# Patient Record
Sex: MALE | Race: WHITE | NOT HISPANIC OR LATINO | Employment: OTHER | URBAN - METROPOLITAN AREA
[De-identification: names, ages, dates, MRNs, and addresses within clinical notes are randomized per-mention and may not be internally consistent; named-entity substitution may affect disease eponyms.]

---

## 2023-03-08 PROBLEM — R76.8 ELEVATED ANTINUCLEAR ANTIBODY (ANA) LEVEL: Status: ACTIVE | Noted: 2023-03-08

## 2023-03-08 PROBLEM — Z86.718 HISTORY OF DVT (DEEP VEIN THROMBOSIS): Status: ACTIVE | Noted: 2023-03-08

## 2023-03-08 PROBLEM — R74.8 ELEVATED ALKALINE PHOSPHATASE LEVEL: Status: ACTIVE | Noted: 2023-03-08

## 2023-03-08 PROBLEM — M35.3 POLYMYALGIA RHEUMATICA (MULTI): Status: ACTIVE | Noted: 2023-03-08

## 2023-03-08 PROBLEM — M81.0 OSTEOPOROSIS: Status: ACTIVE | Noted: 2023-03-08

## 2023-03-08 PROBLEM — F20.2: Status: ACTIVE | Noted: 2023-03-08

## 2023-03-08 PROBLEM — M65.30 TRIGGER FINGER: Status: ACTIVE | Noted: 2023-03-08

## 2023-03-08 PROBLEM — R79.89 HIGH SERUM CHOLESTANOL: Status: ACTIVE | Noted: 2023-03-08

## 2023-03-08 RX ORDER — CYCLOBENZAPRINE HCL 10 MG
10 TABLET ORAL 3 TIMES DAILY PRN
COMMUNITY
End: 2023-09-06 | Stop reason: ALTCHOICE

## 2023-03-08 RX ORDER — ESCITALOPRAM OXALATE 10 MG/1
1.5 TABLET ORAL DAILY
COMMUNITY
End: 2023-03-13

## 2023-03-08 RX ORDER — QUETIAPINE FUMARATE 100 MG/1
TABLET, FILM COATED ORAL
COMMUNITY
End: 2023-03-13

## 2023-03-08 RX ORDER — LAMOTRIGINE 25 MG/1
TABLET ORAL
COMMUNITY
End: 2023-03-13

## 2023-03-08 RX ORDER — HYDROXYZINE HYDROCHLORIDE 25 MG/1
25 TABLET, FILM COATED ORAL 2 TIMES DAILY PRN
COMMUNITY
End: 2023-03-13

## 2023-03-08 RX ORDER — KETOCONAZOLE 20 MG/G
CREAM TOPICAL
COMMUNITY
End: 2023-03-13

## 2023-03-08 RX ORDER — PREDNISONE 5 MG/1
15 TABLET ORAL DAILY
COMMUNITY
Start: 2022-12-21 | End: 2023-03-13

## 2023-03-08 RX ORDER — BUSPIRONE HYDROCHLORIDE 30 MG/1
TABLET ORAL
COMMUNITY
End: 2023-03-13

## 2023-03-08 RX ORDER — MIRTAZAPINE 30 MG/1
30 TABLET, FILM COATED ORAL NIGHTLY
COMMUNITY
End: 2023-03-13

## 2023-03-08 RX ORDER — LITHIUM CARBONATE 300 MG/1
CAPSULE ORAL
COMMUNITY
End: 2023-03-13

## 2023-03-08 RX ORDER — HYDROXYZINE PAMOATE 25 MG/1
25 CAPSULE ORAL 2 TIMES DAILY PRN
COMMUNITY
End: 2023-03-13

## 2023-03-08 RX ORDER — LORAZEPAM 0.5 MG/1
TABLET ORAL
COMMUNITY
End: 2023-03-13

## 2023-03-08 RX ORDER — QUETIAPINE FUMARATE 200 MG/1
TABLET, FILM COATED ORAL
COMMUNITY
End: 2023-03-13

## 2023-03-08 RX ORDER — SERTRALINE HYDROCHLORIDE 50 MG/1
50 TABLET, FILM COATED ORAL DAILY
COMMUNITY
End: 2023-03-13

## 2023-03-08 RX ORDER — SERTRALINE HYDROCHLORIDE 100 MG/1
100 TABLET, FILM COATED ORAL DAILY
COMMUNITY
End: 2023-05-16 | Stop reason: SDUPTHER

## 2023-03-08 RX ORDER — QUETIAPINE FUMARATE 50 MG/1
50 TABLET, FILM COATED ORAL NIGHTLY PRN
COMMUNITY
End: 2023-03-13

## 2023-03-08 RX ORDER — PROPRANOLOL HYDROCHLORIDE 20 MG/1
20 TABLET ORAL 2 TIMES DAILY
COMMUNITY
End: 2023-03-13

## 2023-03-13 ENCOUNTER — OFFICE VISIT (OUTPATIENT)
Dept: PRIMARY CARE | Facility: CLINIC | Age: 73
End: 2023-03-13
Payer: MEDICARE

## 2023-03-13 ENCOUNTER — LAB (OUTPATIENT)
Dept: LAB | Facility: LAB | Age: 73
End: 2023-03-13
Payer: MEDICARE

## 2023-03-13 VITALS
WEIGHT: 198 LBS | HEART RATE: 91 BPM | DIASTOLIC BLOOD PRESSURE: 77 MMHG | HEIGHT: 67 IN | SYSTOLIC BLOOD PRESSURE: 122 MMHG | BODY MASS INDEX: 31.08 KG/M2

## 2023-03-13 DIAGNOSIS — Z13.6 SCREENING FOR AAA (ABDOMINAL AORTIC ANEURYSM): ICD-10-CM

## 2023-03-13 DIAGNOSIS — E78.5 DYSLIPIDEMIA: ICD-10-CM

## 2023-03-13 DIAGNOSIS — M35.3 POLYMYALGIA RHEUMATICA (MULTI): ICD-10-CM

## 2023-03-13 DIAGNOSIS — R53.81 GENERAL DETERIORATION OF HEALTH: ICD-10-CM

## 2023-03-13 DIAGNOSIS — Z12.11 SCREEN FOR COLON CANCER: ICD-10-CM

## 2023-03-13 DIAGNOSIS — R97.20 PSA ELEVATION: ICD-10-CM

## 2023-03-13 DIAGNOSIS — F20.2: ICD-10-CM

## 2023-03-13 DIAGNOSIS — Z12.5 SCREENING PSA (PROSTATE SPECIFIC ANTIGEN): ICD-10-CM

## 2023-03-13 DIAGNOSIS — M80.00XD AGE-RELATED OSTEOPOROSIS WITH CURRENT PATHOLOGICAL FRACTURE WITH ROUTINE HEALING, SUBSEQUENT ENCOUNTER: Primary | ICD-10-CM

## 2023-03-13 DIAGNOSIS — M80.00XD AGE-RELATED OSTEOPOROSIS WITH CURRENT PATHOLOGICAL FRACTURE WITH ROUTINE HEALING, SUBSEQUENT ENCOUNTER: ICD-10-CM

## 2023-03-13 PROBLEM — R76.8 ELEVATED ANTINUCLEAR ANTIBODY (ANA) LEVEL: Status: RESOLVED | Noted: 2023-03-08 | Resolved: 2023-03-13

## 2023-03-13 PROBLEM — M65.30 TRIGGER FINGER: Status: RESOLVED | Noted: 2023-03-08 | Resolved: 2023-03-13

## 2023-03-13 PROBLEM — K62.89 PROCTITIS: Status: ACTIVE | Noted: 2023-03-13

## 2023-03-13 LAB
ANTICARDIOLIPIN IGA ANTIBODY: 0.8 APL U/ML (ref 0–20)
ANTICARDIOLIPIN IGG ANTIBODY: <1.6 GPL U/ML (ref 0–20)
ANTICARDIOLIPIN IGM ANTIBODY: 5.1 MPL U/ML (ref 0–20)
BETA 2 GLYCOPROTEIN 1 IGA AB IN SERUM: 1.4 U/ML (ref 0–20)
BETA 2 GLYCOPROTEIN 1 IGG AB IN SERUM: 2.2 U/ML (ref 0–20)
BETA 2 GLYCOPROTEIN 1 IGM ANTIBODY IN SERUM: 9.7 U/ML (ref 0–20)
CALCIDIOL (25 OH VITAMIN D3) (NG/ML) IN SER/PLAS: 34 NG/ML
ERYTHROCYTE DISTRIBUTION WIDTH (RATIO) BY AUTOMATED COUNT: 13.8 % (ref 11.5–14.5)
ERYTHROCYTE MEAN CORPUSCULAR HEMOGLOBIN CONCENTRATION (G/DL) BY AUTOMATED: 32.2 G/DL (ref 32–36)
ERYTHROCYTE MEAN CORPUSCULAR VOLUME (FL) BY AUTOMATED COUNT: 85 FL (ref 80–100)
ERYTHROCYTES (10*6/UL) IN BLOOD BY AUTOMATED COUNT: 4.77 X10E12/L (ref 4.5–5.9)
HEMATOCRIT (%) IN BLOOD BY AUTOMATED COUNT: 40.7 % (ref 41–52)
HEMOGLOBIN (G/DL) IN BLOOD: 13.1 G/DL (ref 13.5–17.5)
LEUKOCYTES (10*3/UL) IN BLOOD BY AUTOMATED COUNT: 7.6 X10E9/L (ref 4.4–11.3)
NRBC (PER 100 WBCS) BY AUTOMATED COUNT: 0 /100 WBC (ref 0–0)
PLATELETS (10*3/UL) IN BLOOD AUTOMATED COUNT: 321 X10E9/L (ref 150–450)

## 2023-03-13 PROCEDURE — 84153 ASSAY OF PSA TOTAL: CPT

## 2023-03-13 PROCEDURE — 36415 COLL VENOUS BLD VENIPUNCTURE: CPT

## 2023-03-13 PROCEDURE — 84443 ASSAY THYROID STIM HORMONE: CPT

## 2023-03-13 PROCEDURE — 80053 COMPREHEN METABOLIC PANEL: CPT

## 2023-03-13 PROCEDURE — 1036F TOBACCO NON-USER: CPT | Performed by: INTERNAL MEDICINE

## 2023-03-13 PROCEDURE — 82550 ASSAY OF CK (CPK): CPT

## 2023-03-13 PROCEDURE — 86803 HEPATITIS C AB TEST: CPT

## 2023-03-13 PROCEDURE — 99214 OFFICE O/P EST MOD 30 MIN: CPT | Performed by: INTERNAL MEDICINE

## 2023-03-13 PROCEDURE — 87389 HIV-1 AG W/HIV-1&-2 AB AG IA: CPT

## 2023-03-13 PROCEDURE — 85027 COMPLETE CBC AUTOMATED: CPT

## 2023-03-13 PROCEDURE — 80061 LIPID PANEL: CPT

## 2023-03-13 RX ORDER — QUETIAPINE FUMARATE 200 MG/1
200 TABLET, FILM COATED ORAL NIGHTLY
COMMUNITY
End: 2023-05-16 | Stop reason: SDUPTHER

## 2023-03-13 ASSESSMENT — PATIENT HEALTH QUESTIONNAIRE - PHQ9
2. FEELING DOWN, DEPRESSED OR HOPELESS: NOT AT ALL
1. LITTLE INTEREST OR PLEASURE IN DOING THINGS: NOT AT ALL
SUM OF ALL RESPONSES TO PHQ9 QUESTIONS 1 AND 2: 0

## 2023-03-13 NOTE — PROGRESS NOTES
"Subjective   Patient ID: Ryan Rasheed is a 72 y.o. male who presents for New Patient Visit ([facility]).    HPI   Patient is 72-year-old male with past medical history of DVT possible rheumatoid arthritis versus polymyalgia rheumatica, osteoporosis and history of proctitis, bipolar disorder who presents to [facility].    Patient states he recently moved to [place] from [place].  His family lives in [place].    He recently went to the emergency room due to stiffness.  He was seen by rheumatology and thought to be due to PMR.  He states that the short course of steroids resolved his symptoms in its entirety.  He is currently not on any steroids and has follow-up with rheumatology upcoming.  There is a thought that he may have rheumatism however it has not been fully worked up.  There is concerned he may have osteoporosis and history of osteoporotic fractures and he has a DEXA scan currently pending.    He has bipolar disorder and is currently on Seroquel and Zoloft.  He states that the lithium was quite effective historically however he states that his psychiatrist diagnosed him with bipolar.    He reports a history of proctitis which she states was due to excessive ibuprofen intake.  He has stopped ibuprofen in its entirety.    He is overweight which he attributes to his psychiatric medications.  He does not exercise regularly.    He is currently retired and lives alone.    Review of Systems  Constitutional: No fever or chills  Cardiovascular: no chest pain, no palpitations and no syncope.   Respiratory: no cough, no shortness of breath during exertion and no shortness of breath at rest.   Gastrointestinal: no abdominal pain, no nausea and no vomiting.  Neuro: No Headache, no dizziness    Objective   /77   Pulse 91   Ht 1.702 m (5' 7\")   Wt 89.8 kg (198 lb)   BMI 31.01 kg/m²     Physical Exam  Constitutional: Alert and in no acute distress. Well developed, well nourished  Head and Face: Head " and face: Normal.    Cardiovascular: Heart rate and rhythm were normal, normal S1 and S2. No peripheral edema.   Pulmonary: No respiratory distress. Clear bilateral breath sounds.  Musculoskeletal: Gait and station: Normal. Muscle strength/tone: Normal.   Skin: Normal skin color and pigmentation, normal skin turgor, and no rash.    Psychiatric: Judgment and insight: Intact. Mood and affect: Normal.     [unfilled]      Assessment/Plan   Problem List Items Addressed This Visit          Nervous    Polymyalgia rheumatica (CMS/HCC)     Concern for PMR  Following with Rheum   No longer on Steroids ; reeval upcoming          Relevant Orders    Comprehensive metabolic panel    CBC    Lipid Panel    TSH with reflex to Free T4 if abnormal    CK    HIV 1/2 antibodies, rapid    Hepatitis C Antibody       Musculoskeletal    Osteoporosis - Primary     Dexa scan pending  Advised Vit d and Ca otc          Relevant Orders    Comprehensive metabolic panel    CBC    Lipid Panel    TSH with reflex to Free T4 if abnormal    CK    HIV 1/2 antibodies, rapid    Hepatitis C Antibody       Other    Malignant catatonia (CMS/HCC)    Relevant Orders    Comprehensive metabolic panel    CBC    Lipid Panel    TSH with reflex to Free T4 if abnormal    CK    HIV 1/2 antibodies, rapid    Hepatitis C Antibody     Other Visit Diagnoses       Screen for colon cancer        Relevant Orders    Colonoscopy    Comprehensive metabolic panel    CBC    Lipid Panel    TSH with reflex to Free T4 if abnormal    CK    HIV 1/2 antibodies, rapid    Screening PSA (prostate specific antigen)        Relevant Orders    PSA    HIV 1/2 antibodies, rapid    Screening for AAA (abdominal aortic aneurysm)        Relevant Orders    Vascular US abdominal aorta anuerysm AAA screening    Dyslipidemia        Relevant Orders    Lipid Panel    PSA elevation        Relevant Orders    PSA    General deterioration of health        Relevant Orders    TSH with reflex to Free T4 if  abnormal    HIV 1/2 antibodies, rapid          Polymyalgia rheumatica versus RA versus other  Symptoms improved with steroids.  Now off steroids altogether.  Advise follow-up with rheumatology.  Check CPK    Bipolar disorder?  Continue following with psychiatry  Symptoms controlled on sertraline and Seroquel    History of DVT  Unclear cause however completed course of anticoagulation.  No longer on anticoagulation    History of proctitis  Patient reports it was related to NSAID use.  Avoid NSAIDs  We will order screening colonoscopy    Possible osteoporosis  Patient to have DEXA scan    Dyslipidemia  Check lipid panel    Health maintenance  Screening labs ordered  Colonoscopy ordered  Follow-up for physical

## 2023-03-14 LAB
ALANINE AMINOTRANSFERASE (SGPT) (U/L) IN SER/PLAS: 32 U/L (ref 10–52)
ALBUMIN (G/DL) IN SER/PLAS: 4.2 G/DL (ref 3.4–5)
ALKALINE PHOSPHATASE (U/L) IN SER/PLAS: 116 U/L (ref 33–136)
ANION GAP IN SER/PLAS: 14 MMOL/L (ref 10–20)
ANTI-NUCLEAR ANTIBODY (ANA): NEGATIVE
ASPARTATE AMINOTRANSFERASE (SGOT) (U/L) IN SER/PLAS: 31 U/L (ref 9–39)
BILIRUBIN TOTAL (MG/DL) IN SER/PLAS: 0.5 MG/DL (ref 0–1.2)
CALCIUM (MG/DL) IN SER/PLAS: 9.3 MG/DL (ref 8.6–10.6)
CARBON DIOXIDE, TOTAL (MMOL/L) IN SER/PLAS: 27 MMOL/L (ref 21–32)
CHLORIDE (MMOL/L) IN SER/PLAS: 102 MMOL/L (ref 98–107)
CHOLESTEROL (MG/DL) IN SER/PLAS: 199 MG/DL (ref 0–199)
CHOLESTEROL IN HDL (MG/DL) IN SER/PLAS: 45.7 MG/DL
CHOLESTEROL/HDL RATIO: 4.4
CREATINE KINASE (U/L) IN SER/PLAS: 134 U/L (ref 0–325)
CREATININE (MG/DL) IN SER/PLAS: 0.97 MG/DL (ref 0.5–1.3)
GFR MALE: 84 ML/MIN/1.73M2
GLUCOSE (MG/DL) IN SER/PLAS: 84 MG/DL (ref 74–99)
HEPATITIS C VIRUS AB PRESENCE IN SERUM: NONREACTIVE
HIV 1/ 2 AG/AB SCREEN: NONREACTIVE
LDL: 135 MG/DL (ref 0–99)
POTASSIUM (MMOL/L) IN SER/PLAS: 4.3 MMOL/L (ref 3.5–5.3)
PROSTATE SPECIFIC AG (NG/ML) IN SER/PLAS: 1.7 NG/ML (ref 0–4)
PROTEIN TOTAL: 7.5 G/DL (ref 6.4–8.2)
SODIUM (MMOL/L) IN SER/PLAS: 139 MMOL/L (ref 136–145)
THYROTROPIN (MIU/L) IN SER/PLAS BY DETECTION LIMIT <= 0.05 MIU/L: 3.95 MIU/L (ref 0.44–3.98)
TRIGLYCERIDE (MG/DL) IN SER/PLAS: 92 MG/DL (ref 0–149)
UREA NITROGEN (MG/DL) IN SER/PLAS: 21 MG/DL (ref 6–23)
VLDL: 18 MG/DL (ref 0–40)

## 2023-03-16 LAB
BONE SPECIFIC ALKALINE PHOSPHATASE: 17.4 UG/L (ref 6.5–20.1)
CITRULLINE ANTIBODY, IGG: 3 UNITS (ref 0–19)
DILUTE RUSSELL VIPER VENOM TIME CONF: 1.28 RATIO
DILUTE RUSSELL VIPER VENOM TIME SCREEN: 1.39 RATIO
DILUTE RUSSELL VIPER VENOM TIME TEST RATIO: 1.08 RATIO
HLAB27 TYPING: NEGATIVE
LUPUS ANTICOAGULANT INTERPRETATION: NORMAL
NORMALIZED SILICA CLOTTING TIME (RATIO) OF PPP: 0.89 RATIO
SILICA CLOTTING TIME CONFIRMATION: 1.29 RATIO
SILICA CLOTTING TIME SCREEN: 1.14 RATIO

## 2023-03-17 LAB
ALKALINE PHOSPHATASE (REF): 139 U/L (ref 40–120)
ALKALINE PHOSPHATASE OTHER: 0 U/L
ALKALINE PHOSPHATASE.BONE (U/L) IN SERUM OR PLASMA: 38 U/L (ref 0–55)
ALKALINE PHOSPHATASE.LIVER (U/L) IN SERUM OR PLASMA: 101 U/L (ref 0–94)

## 2023-03-28 ENCOUNTER — OFFICE VISIT (OUTPATIENT)
Dept: PRIMARY CARE | Facility: CLINIC | Age: 73
End: 2023-03-28
Payer: MEDICARE

## 2023-03-28 VITALS
OXYGEN SATURATION: 95 % | BODY MASS INDEX: 30.45 KG/M2 | RESPIRATION RATE: 19 BRPM | SYSTOLIC BLOOD PRESSURE: 120 MMHG | HEART RATE: 79 BPM | WEIGHT: 194 LBS | HEIGHT: 67 IN | DIASTOLIC BLOOD PRESSURE: 80 MMHG

## 2023-03-28 DIAGNOSIS — R41.3 MEMORY CHANGES: ICD-10-CM

## 2023-03-28 DIAGNOSIS — Z87.891 FORMER SMOKER: Primary | ICD-10-CM

## 2023-03-28 DIAGNOSIS — Z12.11 COLON CANCER SCREENING: ICD-10-CM

## 2023-03-28 PROCEDURE — 1036F TOBACCO NON-USER: CPT | Performed by: STUDENT IN AN ORGANIZED HEALTH CARE EDUCATION/TRAINING PROGRAM

## 2023-03-28 PROCEDURE — 99215 OFFICE O/P EST HI 40 MIN: CPT | Performed by: STUDENT IN AN ORGANIZED HEALTH CARE EDUCATION/TRAINING PROGRAM
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
## 2023-03-28 NOTE — PATIENT INSTRUCTIONS
Thank you for coming in to see us today!    Please schedule a colonoscopy at a time that works for you, as well as get your ULTRASOUND done of your abdomen to assess for an aneurysm given your remote smoking history.     I have also referred you for NEUROPSYCHOLOGY evaluation given your memory concerns. This may take a few months to get an appointment but will be very helpful in our assessment.     I would like to see you back for a complete physical in 2-3 months, sooner if any acute issues arise.     Thank you,  Dr. Gomez

## 2023-05-16 ENCOUNTER — APPOINTMENT (OUTPATIENT)
Dept: PRIMARY CARE | Facility: CLINIC | Age: 73
End: 2023-05-16
Payer: MEDICARE

## 2023-05-16 ENCOUNTER — OFFICE VISIT (OUTPATIENT)
Dept: PRIMARY CARE | Facility: CLINIC | Age: 73
End: 2023-05-16
Payer: MEDICARE

## 2023-05-16 VITALS
BODY MASS INDEX: 30.45 KG/M2 | SYSTOLIC BLOOD PRESSURE: 145 MMHG | HEART RATE: 93 BPM | WEIGHT: 194 LBS | OXYGEN SATURATION: 95 % | DIASTOLIC BLOOD PRESSURE: 86 MMHG | RESPIRATION RATE: 17 BRPM | HEIGHT: 67 IN

## 2023-05-16 DIAGNOSIS — F41.0 PANIC DISORDER: ICD-10-CM

## 2023-05-16 DIAGNOSIS — F31.70 BIPOLAR AFFECTIVE DISORDER IN REMISSION (MULTI): Primary | ICD-10-CM

## 2023-05-16 DIAGNOSIS — F51.01 PRIMARY INSOMNIA: ICD-10-CM

## 2023-05-16 PROBLEM — F13.21: Status: ACTIVE | Noted: 2021-03-28

## 2023-05-16 PROBLEM — F43.9 SITUATIONAL STRESS: Status: ACTIVE | Noted: 2023-01-16

## 2023-05-16 PROBLEM — F31.4: Status: ACTIVE | Noted: 2020-12-07

## 2023-05-16 PROBLEM — F33.9 RECURRENT MAJOR DEPRESSIVE DISORDER (CMS-HCC): Status: ACTIVE | Noted: 2023-01-16

## 2023-05-16 PROBLEM — F90.0 ATTENTION-DEFICIT HYPERACTIVITY DISORDER, PREDOMINANTLY INATTENTIVE TYPE: Status: ACTIVE | Noted: 2023-01-16

## 2023-05-16 PROBLEM — F10.21 ALCOHOL DEPENDENCE IN EARLY FULL REMISSION (MULTI): Status: ACTIVE | Noted: 2020-12-07

## 2023-05-16 PROBLEM — M54.16 LUMBAR RADICULITIS: Status: ACTIVE | Noted: 2023-05-16

## 2023-05-16 PROBLEM — F41.1 GENERALIZED ANXIETY DISORDER: Status: ACTIVE | Noted: 2020-12-07

## 2023-05-16 PROCEDURE — 1036F TOBACCO NON-USER: CPT | Performed by: STUDENT IN AN ORGANIZED HEALTH CARE EDUCATION/TRAINING PROGRAM

## 2023-05-16 PROCEDURE — 99213 OFFICE O/P EST LOW 20 MIN: CPT | Performed by: STUDENT IN AN ORGANIZED HEALTH CARE EDUCATION/TRAINING PROGRAM

## 2023-05-16 RX ORDER — QUETIAPINE FUMARATE 200 MG/1
200 TABLET, FILM COATED ORAL NIGHTLY
Qty: 30 TABLET | Refills: 30 | Status: SHIPPED | OUTPATIENT
Start: 2023-05-16 | End: 2023-09-06 | Stop reason: ALTCHOICE

## 2023-05-16 RX ORDER — ALENDRONATE SODIUM 70 MG/1
70 TABLET ORAL
COMMUNITY
Start: 2023-03-14 | End: 2023-12-07 | Stop reason: SDUPTHER

## 2023-05-16 RX ORDER — CHOLECALCIFEROL (VITAMIN D3) 125 MCG
1 CAPSULE ORAL DAILY
COMMUNITY
Start: 2023-03-14

## 2023-05-16 RX ORDER — SERTRALINE HYDROCHLORIDE 100 MG/1
100 TABLET, FILM COATED ORAL DAILY
Qty: 90 TABLET | Refills: 90 | Status: SHIPPED | OUTPATIENT
Start: 2023-05-16 | End: 2023-09-06 | Stop reason: ALTCHOICE

## 2023-05-16 RX ORDER — MULTIVITAMIN
1 TABLET ORAL DAILY
COMMUNITY
Start: 2023-03-14
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
## 2023-05-17 ENCOUNTER — APPOINTMENT (OUTPATIENT)
Dept: PRIMARY CARE | Facility: CLINIC | Age: 73
End: 2023-05-17
Payer: MEDICARE

## 2023-06-02 ENCOUNTER — APPOINTMENT (OUTPATIENT)
Dept: PRIMARY CARE | Facility: CLINIC | Age: 73
End: 2023-06-02
Payer: MEDICARE

## 2023-06-06 ENCOUNTER — OFFICE VISIT (OUTPATIENT)
Dept: PRIMARY CARE | Facility: CLINIC | Age: 73
End: 2023-06-06
Payer: MEDICARE

## 2023-06-06 VITALS
DIASTOLIC BLOOD PRESSURE: 83 MMHG | HEIGHT: 67 IN | OXYGEN SATURATION: 95 % | WEIGHT: 194 LBS | SYSTOLIC BLOOD PRESSURE: 131 MMHG | RESPIRATION RATE: 17 BRPM | BODY MASS INDEX: 30.45 KG/M2 | HEART RATE: 75 BPM

## 2023-06-06 DIAGNOSIS — Z12.11 COLON CANCER SCREENING: ICD-10-CM

## 2023-06-06 DIAGNOSIS — E78.5 DYSLIPIDEMIA: Primary | ICD-10-CM

## 2023-06-06 PROBLEM — M51.36 BULGING LUMBAR DISC: Status: ACTIVE | Noted: 2023-05-16

## 2023-06-06 PROBLEM — M51.369 BULGING LUMBAR DISC: Status: ACTIVE | Noted: 2023-05-16

## 2023-06-06 PROCEDURE — 99214 OFFICE O/P EST MOD 30 MIN: CPT | Performed by: STUDENT IN AN ORGANIZED HEALTH CARE EDUCATION/TRAINING PROGRAM

## 2023-06-06 PROCEDURE — 1036F TOBACCO NON-USER: CPT | Performed by: STUDENT IN AN ORGANIZED HEALTH CARE EDUCATION/TRAINING PROGRAM

## 2023-06-06 RX ORDER — PREDNISONE 1 MG/1
TABLET ORAL
COMMUNITY
Start: 2023-05-23 | End: 2023-09-06 | Stop reason: ALTCHOICE
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
## 2023-09-06 ENCOUNTER — OFFICE VISIT (OUTPATIENT)
Dept: PRIMARY CARE | Facility: CLINIC | Age: 73
End: 2023-09-06
Payer: MEDICARE

## 2023-09-06 VITALS
SYSTOLIC BLOOD PRESSURE: 140 MMHG | DIASTOLIC BLOOD PRESSURE: 90 MMHG | OXYGEN SATURATION: 96 % | BODY MASS INDEX: 33.75 KG/M2 | HEIGHT: 66 IN | WEIGHT: 210 LBS | HEART RATE: 93 BPM

## 2023-09-06 DIAGNOSIS — K62.5 RECTAL BLEEDING: ICD-10-CM

## 2023-09-06 DIAGNOSIS — Z23 IMMUNIZATION DUE: ICD-10-CM

## 2023-09-06 DIAGNOSIS — R73.01 IMPAIRED FASTING BLOOD SUGAR: ICD-10-CM

## 2023-09-06 DIAGNOSIS — Z00.00 MEDICARE ANNUAL WELLNESS VISIT, SUBSEQUENT: Primary | ICD-10-CM

## 2023-09-06 DIAGNOSIS — Z12.5 PROSTATE CANCER SCREENING: ICD-10-CM

## 2023-09-06 DIAGNOSIS — E66.9 OBESITY (BMI 30-39.9): ICD-10-CM

## 2023-09-06 DIAGNOSIS — E55.9 MILD VITAMIN D DEFICIENCY: ICD-10-CM

## 2023-09-06 DIAGNOSIS — F31.9 BIPOLAR AFFECTIVE DISORDER, REMISSION STATUS UNSPECIFIED (MULTI): ICD-10-CM

## 2023-09-06 PROCEDURE — G0439 PPPS, SUBSEQ VISIT: HCPCS | Performed by: STUDENT IN AN ORGANIZED HEALTH CARE EDUCATION/TRAINING PROGRAM

## 2023-09-06 PROCEDURE — 1160F RVW MEDS BY RX/DR IN RCRD: CPT | Performed by: STUDENT IN AN ORGANIZED HEALTH CARE EDUCATION/TRAINING PROGRAM

## 2023-09-06 PROCEDURE — 90715 TDAP VACCINE 7 YRS/> IM: CPT | Performed by: STUDENT IN AN ORGANIZED HEALTH CARE EDUCATION/TRAINING PROGRAM

## 2023-09-06 PROCEDURE — 90471 IMMUNIZATION ADMIN: CPT | Performed by: STUDENT IN AN ORGANIZED HEALTH CARE EDUCATION/TRAINING PROGRAM

## 2023-09-06 PROCEDURE — 1170F FXNL STATUS ASSESSED: CPT | Performed by: STUDENT IN AN ORGANIZED HEALTH CARE EDUCATION/TRAINING PROGRAM

## 2023-09-06 PROCEDURE — 99214 OFFICE O/P EST MOD 30 MIN: CPT | Performed by: STUDENT IN AN ORGANIZED HEALTH CARE EDUCATION/TRAINING PROGRAM

## 2023-09-06 PROCEDURE — 1159F MED LIST DOCD IN RCRD: CPT | Performed by: STUDENT IN AN ORGANIZED HEALTH CARE EDUCATION/TRAINING PROGRAM

## 2023-09-06 PROCEDURE — 1036F TOBACCO NON-USER: CPT | Performed by: STUDENT IN AN ORGANIZED HEALTH CARE EDUCATION/TRAINING PROGRAM

## 2023-09-06 RX ORDER — PREDNISONE 5 MG/1
TABLET ORAL
COMMUNITY
Start: 2023-07-12 | End: 2023-09-06 | Stop reason: ALTCHOICE

## 2023-09-06 RX ORDER — LUMATEPERONE 42 MG/1
42 CAPSULE ORAL DAILY
COMMUNITY
Start: 2023-07-23 | End: 2023-09-06

## 2023-09-06 RX ORDER — CYCLOBENZAPRINE HCL 5 MG
5 TABLET ORAL 3 TIMES DAILY PRN
COMMUNITY
End: 2023-12-07 | Stop reason: SDUPTHER

## 2023-09-06 RX ORDER — QUETIAPINE FUMARATE 200 MG/1
1 TABLET, FILM COATED ORAL NIGHTLY
COMMUNITY
Start: 2023-03-14 | End: 2024-05-06 | Stop reason: HOSPADM

## 2023-09-06 RX ORDER — SERTRALINE HYDROCHLORIDE 50 MG/1
50 TABLET, FILM COATED ORAL
COMMUNITY
Start: 2023-07-31 | End: 2023-10-23 | Stop reason: ALTCHOICE

## 2023-09-06 RX ORDER — CYCLOBENZAPRINE HCL 5 MG
5 TABLET ORAL 3 TIMES DAILY PRN
COMMUNITY
Start: 2023-07-16 | End: 2023-09-06 | Stop reason: ALTCHOICE

## 2023-09-06 RX ORDER — TERBINAFINE HYDROCHLORIDE 250 MG/1
250 TABLET ORAL DAILY
COMMUNITY
Start: 2023-07-01 | End: 2023-09-06 | Stop reason: ALTCHOICE

## 2023-09-06 RX ORDER — LAMOTRIGINE 25 MG/1
1 TABLET ORAL DAILY
Status: ON HOLD | COMMUNITY
Start: 2023-08-28 | End: 2024-05-03 | Stop reason: ALTCHOICE

## 2023-09-06 RX ORDER — HYDROXYZINE HYDROCHLORIDE 25 MG/1
25 TABLET, FILM COATED ORAL 3 TIMES DAILY PRN
COMMUNITY
Start: 2023-09-05

## 2023-09-06 RX ORDER — QUETIAPINE FUMARATE 25 MG/1
25 TABLET, FILM COATED ORAL NIGHTLY
COMMUNITY
End: 2023-09-06 | Stop reason: ALTCHOICE

## 2023-09-06 ASSESSMENT — PATIENT HEALTH QUESTIONNAIRE - PHQ9
1. LITTLE INTEREST OR PLEASURE IN DOING THINGS: SEVERAL DAYS
10. IF YOU CHECKED OFF ANY PROBLEMS, HOW DIFFICULT HAVE THESE PROBLEMS MADE IT FOR YOU TO DO YOUR WORK, TAKE CARE OF THINGS AT HOME, OR GET ALONG WITH OTHER PEOPLE: SOMEWHAT DIFFICULT
SUM OF ALL RESPONSES TO PHQ9 QUESTIONS 1 AND 2: 2
2. FEELING DOWN, DEPRESSED OR HOPELESS: SEVERAL DAYS

## 2023-09-06 ASSESSMENT — ACTIVITIES OF DAILY LIVING (ADL)
DRESSING: INDEPENDENT
TAKING_MEDICATION: INDEPENDENT
BATHING: INDEPENDENT
DOING_HOUSEWORK: INDEPENDENT
GROCERY_SHOPPING: INDEPENDENT
MANAGING_FINANCES: INDEPENDENT

## 2023-09-06 ASSESSMENT — ENCOUNTER SYMPTOMS
DEPRESSION: 1
OCCASIONAL FEELINGS OF UNSTEADINESS: 0
LOSS OF SENSATION IN FEET: 0

## 2023-09-06 NOTE — PATIENT INSTRUCTIONS
Thank you for coming in today!    I would recommend a FLU shot later this month, an updated COVID booster in October, and consideration of the new RSV vaccine in November.     Tetanus shot today. You note you think you have had pneumonia shot (called PNEUMOVAX or PREVNAR). Try to verify, if you are unable to, we will give at your next visit. You are also eligible for the Shingles shot (SHINGRIX) if you haven't received already.     Please get your COLONOSCOPY completed. Order placed and provided. You can call [phone] to schedule.    Get you CT Calcium score to further characterize your heart attack risk. Please also get updated FASTING lab work between now and our next visit.    Start checking blood pressure at home once per day in the morning, about an hour after waking. Bring your blood pressure log with you to the next visit.     Reschedule your NEUROPSYCHIATRIC testing for memory evaluation.     Follow up with me in 2 months!    Best,  Dr. Gomez
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
## 2023-09-07 ENCOUNTER — TELEPHONE (OUTPATIENT)
Dept: PRIMARY CARE | Facility: CLINIC | Age: 73
End: 2023-09-07

## 2023-09-07 DIAGNOSIS — F31.70 BIPOLAR AFFECTIVE DISORDER IN REMISSION (MULTI): Primary | ICD-10-CM

## 2023-10-23 ENCOUNTER — OFFICE VISIT (OUTPATIENT)
Dept: PRIMARY CARE | Facility: CLINIC | Age: 73
End: 2023-10-23
Payer: MEDICARE

## 2023-10-23 VITALS
HEIGHT: 67 IN | HEART RATE: 91 BPM | BODY MASS INDEX: 32.96 KG/M2 | WEIGHT: 210 LBS | SYSTOLIC BLOOD PRESSURE: 150 MMHG | DIASTOLIC BLOOD PRESSURE: 91 MMHG

## 2023-10-23 DIAGNOSIS — Z86.718 HISTORY OF DVT (DEEP VEIN THROMBOSIS): Primary | ICD-10-CM

## 2023-10-23 DIAGNOSIS — M65.30 TRIGGER FINGER, UNSPECIFIED FINGER, UNSPECIFIED LATERALITY: ICD-10-CM

## 2023-10-23 DIAGNOSIS — M35.3 POLYMYALGIA RHEUMATICA (MULTI): ICD-10-CM

## 2023-10-23 DIAGNOSIS — F33.42 RECURRENT MAJOR DEPRESSIVE DISORDER, IN FULL REMISSION (CMS-HCC): ICD-10-CM

## 2023-10-23 DIAGNOSIS — F10.21 ALCOHOL DEPENDENCE IN EARLY FULL REMISSION (MULTI): ICD-10-CM

## 2023-10-23 DIAGNOSIS — F31.4: ICD-10-CM

## 2023-10-23 DIAGNOSIS — M80.00XD AGE-RELATED OSTEOPOROSIS WITH CURRENT PATHOLOGICAL FRACTURE WITH ROUTINE HEALING, SUBSEQUENT ENCOUNTER: ICD-10-CM

## 2023-10-23 PROBLEM — F39 MOOD DISORDER (CMS-HCC): Status: ACTIVE | Noted: 2023-10-23

## 2023-10-23 PROBLEM — F31.60 BIPOLAR AFFECTIVE DISORDER, MIXED (MULTI): Status: ACTIVE | Noted: 2023-10-23

## 2023-10-23 PROCEDURE — 1160F RVW MEDS BY RX/DR IN RCRD: CPT | Performed by: INTERNAL MEDICINE

## 2023-10-23 PROCEDURE — 1036F TOBACCO NON-USER: CPT | Performed by: INTERNAL MEDICINE

## 2023-10-23 PROCEDURE — 1159F MED LIST DOCD IN RCRD: CPT | Performed by: INTERNAL MEDICINE

## 2023-10-23 PROCEDURE — 99214 OFFICE O/P EST MOD 30 MIN: CPT | Performed by: INTERNAL MEDICINE

## 2023-10-23 NOTE — PROGRESS NOTES
Subjective   Patient ID: Ryan Rasheed is a 73 y.o. male who presents for Establish Care.    HPI   Patient is 73-year-old male with past medical history of DVT possible rheumatoid arthritis versus polymyalgia rheumatica, osteoporosis and history of proctitis, bipolar disorder who presents for follow-up.  Patient was previously seen in May 2023 and then he switched to another primary care doctor and now he is return for unclear reasons.    Patient states that he has previously been diagnosed with lower back pain and follows with a spinal institution in [place].  He is going for minimally invasive spinal surgery and needs medical clearance.  He does not have the form readily available at this time.  He has no cardiac history.  He denies shortness of breath on exertion he is able to walk 2 city blocks without getting short of breath and walking up and down the stairs without getting short of breath.  It is unclear what needs to be done prior to the surgery.  Patient will have to reach out to the surgeon to discuss what needs to be done.    He has hypertension that significantly elevated at 150/91.  He is taking his medications as prescribed.  He states that his previous blood pressures have been better although his last blood pressure done by his previous PCP was also elevated.  He denies headache changes vision orthopnea.      He recently went to the emergency room due to stiffness.  He was seen by rheumatology and thought to be due to PMR.  He states that the short course of steroids resolved his symptoms in its entirety.  He is now on a chronic dose of prednisone for treatment of his PMR.  He states that his symptoms are doing well in fact his inflammatory markers are improving.  Of note he initially saw a rheumatologist at  and while she was on maternity leave he went to a rheumatologist at [facility].  Now he plans on returning to the rheumatologist at [facility].  His CRP and ESR have improved while on the  "prednisone.  He also follows with a hand surgeon and there is concern about carpal tunnel syndrome versus trigger finger versus whether or not there may be rheumatoid arthritis present.  He was told that he needs to be evaluated for rheumatoid arthritis as if he in fact does have this condition he would alter the upcoming surgery being planned.  Patient had a negative CCP as well as RF factor.      He has bipolar disorder and is currently on Seroquel and lithium and lamotrigine.  He states that the lithium was quite effective historically however he states that his psychiatrist diagnosed him with bipolar.    He reports a history of proctitis which she states was due to excessive ibuprofen intake.  He has stopped ibuprofen in its entirety.    He is overweight which he attributes to his psychiatric medications.  He does not exercise regularly.    He is currently retired and lives alone.    History of alcohol use disorder.  Stopped drinking in December 2022.  Not interested in medications related to the alcohol use disorder.  He is following up with psychiatry on a regular basis    Review of Systems  Constitutional: No fever or chills  Cardiovascular: no chest pain, no palpitations and no syncope.   Respiratory: no cough, no shortness of breath during exertion and no shortness of breath at rest.   Gastrointestinal: no abdominal pain, no nausea and no vomiting.  Neuro: No Headache, no dizziness    Objective   BP (!) 150/91   Pulse 91   Ht 1.702 m (5' 7\")   Wt 95.3 kg (210 lb)   BMI 32.89 kg/m²     Physical Exam  Constitutional: Alert and in no acute distress. Well developed, well nourished  Head and Face: Head and face: Normal.    Cardiovascular: Heart rate and rhythm were normal, normal S1 and S2. No peripheral edema.   Pulmonary: No respiratory distress. Clear bilateral breath sounds.  Musculoskeletal: Gait and station: Normal. Muscle strength/tone: Normal.   Skin: Normal skin color and pigmentation, normal skin " turgor, and no rash.    Psychiatric: Judgment and insight: Intact. Mood and affect: Normal.     [unfilled]      Assessment/Plan   Problem List Items Addressed This Visit       Alcohol dependence in early full remission (CMS/[facility])     Polymyalgia rheumatica versus RA versus other  Check CPK.  Now on prednisone chronically.  Continue following with rheumatology.  If there is a question about whether or not rheumatoid arthritis is present he would need to discuss with the rheumatologist.    Bipolar disorder  Continue following with psychiatry  Controlled on current medications    History of DVT  Unclear cause however completed course of anticoagulation.  No longer on anticoagulation    History of proctitis  Patient reports it was related to NSAID use.  Avoid NSAIDs  Colonoscopy ordered not done    Possible osteoporosis  Patient to have DEXA scan    Dyslipidemia  Check lipid panel    Hypertension  Uncontrolled at this time.  Explained that he would likely not be able to go through surgery if his blood pressure is elevated.  He is not interested in blood pressure medications at this time we will advised patient to return to clinic in 1 week for reevaluation.  If his blood pressure is still elevated will need blood pressure medications prior to surgery      Health maintenance  Screening labs ordered  Colonoscopy ordered  Follow-up 1 week

## 2023-11-08 ENCOUNTER — OFFICE VISIT (OUTPATIENT)
Dept: RHEUMATOLOGY | Facility: CLINIC | Age: 73
End: 2023-11-08
Payer: MEDICARE

## 2023-11-08 VITALS
WEIGHT: 216 LBS | HEIGHT: 67 IN | HEART RATE: 76 BPM | DIASTOLIC BLOOD PRESSURE: 71 MMHG | SYSTOLIC BLOOD PRESSURE: 106 MMHG | BODY MASS INDEX: 33.9 KG/M2

## 2023-11-08 DIAGNOSIS — R76.8 ELEVATED ANTINUCLEAR ANTIBODY (ANA) LEVEL: Primary | ICD-10-CM

## 2023-11-08 DIAGNOSIS — M35.3 POLYMYALGIA RHEUMATICA (MULTI): ICD-10-CM

## 2023-11-08 DIAGNOSIS — M80.00XD AGE-RELATED OSTEOPOROSIS WITH CURRENT PATHOLOGICAL FRACTURE WITH ROUTINE HEALING, SUBSEQUENT ENCOUNTER: ICD-10-CM

## 2023-11-08 PROCEDURE — 1159F MED LIST DOCD IN RCRD: CPT | Performed by: STUDENT IN AN ORGANIZED HEALTH CARE EDUCATION/TRAINING PROGRAM

## 2023-11-08 PROCEDURE — 1160F RVW MEDS BY RX/DR IN RCRD: CPT | Performed by: STUDENT IN AN ORGANIZED HEALTH CARE EDUCATION/TRAINING PROGRAM

## 2023-11-08 PROCEDURE — 99214 OFFICE O/P EST MOD 30 MIN: CPT | Performed by: STUDENT IN AN ORGANIZED HEALTH CARE EDUCATION/TRAINING PROGRAM

## 2023-11-08 PROCEDURE — 1036F TOBACCO NON-USER: CPT | Performed by: STUDENT IN AN ORGANIZED HEALTH CARE EDUCATION/TRAINING PROGRAM
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
## 2023-12-07 ENCOUNTER — TELEPHONE (OUTPATIENT)
Dept: RHEUMATOLOGY | Facility: CLINIC | Age: 73
End: 2023-12-07

## 2023-12-07 ENCOUNTER — OFFICE VISIT (OUTPATIENT)
Dept: RHEUMATOLOGY | Facility: CLINIC | Age: 73
End: 2023-12-07
Payer: MEDICARE

## 2023-12-07 ENCOUNTER — APPOINTMENT (OUTPATIENT)
Dept: DERMATOLOGY | Facility: CLINIC | Age: 73
End: 2023-12-07
Payer: MEDICARE

## 2023-12-07 VITALS
WEIGHT: 209.2 LBS | DIASTOLIC BLOOD PRESSURE: 69 MMHG | SYSTOLIC BLOOD PRESSURE: 108 MMHG | HEART RATE: 73 BPM | BODY MASS INDEX: 32.77 KG/M2

## 2023-12-07 DIAGNOSIS — M35.3 POLYMYALGIA RHEUMATICA (MULTI): Primary | ICD-10-CM

## 2023-12-07 DIAGNOSIS — M62.9 HAMSTRING TIGHTNESS OF BOTH LOWER EXTREMITIES: ICD-10-CM

## 2023-12-07 DIAGNOSIS — M80.00XD AGE-RELATED OSTEOPOROSIS WITH CURRENT PATHOLOGICAL FRACTURE WITH ROUTINE HEALING, SUBSEQUENT ENCOUNTER: ICD-10-CM

## 2023-12-07 PROCEDURE — 99214 OFFICE O/P EST MOD 30 MIN: CPT | Performed by: STUDENT IN AN ORGANIZED HEALTH CARE EDUCATION/TRAINING PROGRAM

## 2023-12-07 PROCEDURE — 1036F TOBACCO NON-USER: CPT | Performed by: STUDENT IN AN ORGANIZED HEALTH CARE EDUCATION/TRAINING PROGRAM

## 2023-12-07 PROCEDURE — 1159F MED LIST DOCD IN RCRD: CPT | Performed by: STUDENT IN AN ORGANIZED HEALTH CARE EDUCATION/TRAINING PROGRAM

## 2023-12-07 PROCEDURE — 1160F RVW MEDS BY RX/DR IN RCRD: CPT | Performed by: STUDENT IN AN ORGANIZED HEALTH CARE EDUCATION/TRAINING PROGRAM

## 2023-12-07 RX ORDER — CYCLOBENZAPRINE HCL 5 MG
TABLET ORAL
Qty: 180 TABLET | Refills: 11 | Status: SHIPPED | OUTPATIENT
Start: 2023-12-07 | End: 2023-12-07 | Stop reason: SDUPTHER

## 2023-12-07 RX ORDER — CYCLOBENZAPRINE HCL 5 MG
TABLET ORAL
Qty: 180 TABLET | Refills: 11 | Status: SHIPPED | OUTPATIENT
Start: 2023-12-07 | End: 2024-01-08 | Stop reason: SDUPTHER

## 2023-12-07 RX ORDER — ALENDRONATE SODIUM 70 MG/1
70 TABLET ORAL
Qty: 4 TABLET | Refills: 11 | Status: SHIPPED | OUTPATIENT
Start: 2023-12-07 | End: 2024-05-06 | Stop reason: HOSPADM

## 2023-12-07 RX ORDER — ALENDRONATE SODIUM 70 MG/1
70 TABLET ORAL
Qty: 4 TABLET | Refills: 11 | Status: SHIPPED | OUTPATIENT
Start: 2023-12-07 | End: 2023-12-07 | Stop reason: SDUPTHER
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
## 2023-12-12 DIAGNOSIS — M62.9 HAMSTRING TIGHTNESS OF BOTH LOWER EXTREMITIES: ICD-10-CM

## 2023-12-12 RX ORDER — CYCLOBENZAPRINE HCL 5 MG
TABLET ORAL
Qty: 180 TABLET | Refills: 11 | OUTPATIENT
Start: 2023-12-12

## 2023-12-18 ENCOUNTER — APPOINTMENT (OUTPATIENT)
Dept: PRIMARY CARE | Facility: CLINIC | Age: 73
End: 2023-12-18
Payer: MEDICARE

## 2023-12-29 ENCOUNTER — APPOINTMENT (OUTPATIENT)
Dept: PRIMARY CARE | Facility: CLINIC | Age: 73
End: 2023-12-29
Payer: MEDICARE

## 2024-01-04 ENCOUNTER — OFFICE VISIT (OUTPATIENT)
Dept: RHEUMATOLOGY | Facility: CLINIC | Age: 74
End: 2024-01-04
Payer: MEDICARE

## 2024-01-04 VITALS
SYSTOLIC BLOOD PRESSURE: 122 MMHG | DIASTOLIC BLOOD PRESSURE: 71 MMHG | WEIGHT: 202 LBS | HEART RATE: 82 BPM | BODY MASS INDEX: 31.64 KG/M2

## 2024-01-04 DIAGNOSIS — M19.042 PRIMARY OSTEOARTHRITIS OF BOTH HANDS: ICD-10-CM

## 2024-01-04 DIAGNOSIS — R76.8 ELEVATED ANTINUCLEAR ANTIBODY (ANA) LEVEL: ICD-10-CM

## 2024-01-04 DIAGNOSIS — M65.30 TRIGGER FINGER, UNSPECIFIED FINGER, UNSPECIFIED LATERALITY: ICD-10-CM

## 2024-01-04 DIAGNOSIS — M35.3 POLYMYALGIA RHEUMATICA (MULTI): ICD-10-CM

## 2024-01-04 DIAGNOSIS — R74.8 ELEVATED ALKALINE PHOSPHATASE LEVEL: Primary | ICD-10-CM

## 2024-01-04 DIAGNOSIS — M19.041 PRIMARY OSTEOARTHRITIS OF BOTH HANDS: ICD-10-CM

## 2024-01-04 PROCEDURE — 99214 OFFICE O/P EST MOD 30 MIN: CPT | Performed by: STUDENT IN AN ORGANIZED HEALTH CARE EDUCATION/TRAINING PROGRAM

## 2024-01-04 PROCEDURE — 1036F TOBACCO NON-USER: CPT | Performed by: STUDENT IN AN ORGANIZED HEALTH CARE EDUCATION/TRAINING PROGRAM

## 2024-01-04 PROCEDURE — 1159F MED LIST DOCD IN RCRD: CPT | Performed by: STUDENT IN AN ORGANIZED HEALTH CARE EDUCATION/TRAINING PROGRAM

## 2024-01-04 RX ORDER — TRAZODONE HYDROCHLORIDE 50 MG/1
50 TABLET ORAL NIGHTLY
Status: ON HOLD | COMMUNITY
End: 2024-05-06 | Stop reason: ENTERED-IN-ERROR

## 2024-01-04 RX ORDER — TRAZODONE HYDROCHLORIDE 150 MG/1
150 TABLET ORAL NIGHTLY
Status: ON HOLD | COMMUNITY
End: 2024-05-06 | Stop reason: ENTERED-IN-ERROR

## 2024-01-04 NOTE — PATIENT INSTRUCTIONS
Trial ibuprofen 600 mg in the morning with food , afterwards take 400 mg every 6-8 hours as needed with food. Only use it when the voltaren gel isnt cutting it.     Very liberally, use voltaren gel (diclofenac gel 1%) over your hands as many as times as you need. This is over the counter    You likely have some arthritis in your hands; but the main thing is do is use pain relief modalities    I do think that the increased dose of ibuprofen and Voltaren gel will help
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
## 2024-01-08 DIAGNOSIS — M62.9 HAMSTRING TIGHTNESS OF BOTH LOWER EXTREMITIES: ICD-10-CM

## 2024-01-08 RX ORDER — CYCLOBENZAPRINE HCL 5 MG
TABLET ORAL
Qty: 180 TABLET | Refills: 11 | Status: SHIPPED | OUTPATIENT
Start: 2024-01-08 | End: 2024-05-06 | Stop reason: HOSPADM

## 2024-01-23 ENCOUNTER — APPOINTMENT (OUTPATIENT)
Dept: PRIMARY CARE | Facility: CLINIC | Age: 74
End: 2024-01-23
Payer: MEDICARE

## 2024-02-29 ENCOUNTER — OFFICE VISIT (OUTPATIENT)
Dept: RHEUMATOLOGY | Facility: CLINIC | Age: 74
End: 2024-02-29
Payer: MEDICARE

## 2024-02-29 VITALS — DIASTOLIC BLOOD PRESSURE: 66 MMHG | SYSTOLIC BLOOD PRESSURE: 120 MMHG | HEART RATE: 72 BPM | RESPIRATION RATE: 20 BRPM

## 2024-02-29 DIAGNOSIS — M79.2 NERVE PAIN: Primary | ICD-10-CM

## 2024-02-29 PROCEDURE — 1036F TOBACCO NON-USER: CPT | Performed by: STUDENT IN AN ORGANIZED HEALTH CARE EDUCATION/TRAINING PROGRAM

## 2024-02-29 PROCEDURE — 1159F MED LIST DOCD IN RCRD: CPT | Performed by: STUDENT IN AN ORGANIZED HEALTH CARE EDUCATION/TRAINING PROGRAM

## 2024-02-29 PROCEDURE — 99215 OFFICE O/P EST HI 40 MIN: CPT | Performed by: STUDENT IN AN ORGANIZED HEALTH CARE EDUCATION/TRAINING PROGRAM

## 2024-02-29 RX ORDER — GABAPENTIN 100 MG/1
100 CAPSULE ORAL NIGHTLY
Qty: 90 CAPSULE | Refills: 3 | Status: SHIPPED | OUTPATIENT
Start: 2024-02-29 | End: 2024-02-29

## 2024-02-29 RX ORDER — GABAPENTIN 100 MG/1
100 CAPSULE ORAL NIGHTLY
Qty: 90 CAPSULE | Refills: 3 | Status: SHIPPED | OUTPATIENT
Start: 2024-02-29 | End: 2025-02-28
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
## 2024-03-19 ENCOUNTER — APPOINTMENT (OUTPATIENT)
Dept: RHEUMATOLOGY | Facility: CLINIC | Age: 74
End: 2024-03-19
Payer: MEDICARE

## 2024-03-20 ENCOUNTER — OFFICE VISIT (OUTPATIENT)
Dept: RHEUMATOLOGY | Facility: CLINIC | Age: 74
End: 2024-03-20
Payer: MEDICARE

## 2024-03-20 VITALS — SYSTOLIC BLOOD PRESSURE: 117 MMHG | DIASTOLIC BLOOD PRESSURE: 74 MMHG | HEART RATE: 76 BPM

## 2024-03-20 DIAGNOSIS — M65.30 TRIGGER FINGER, UNSPECIFIED FINGER, UNSPECIFIED LATERALITY: ICD-10-CM

## 2024-03-20 DIAGNOSIS — M35.3 POLYMYALGIA RHEUMATICA (MULTI): ICD-10-CM

## 2024-03-20 DIAGNOSIS — M62.89 HAMSTRING TIGHTNESS: Primary | ICD-10-CM

## 2024-03-20 DIAGNOSIS — M81.0 AGE RELATED OSTEOPOROSIS, UNSPECIFIED PATHOLOGICAL FRACTURE PRESENCE: ICD-10-CM

## 2024-03-20 DIAGNOSIS — M15.9 GENERALIZED OSTEOARTHRITIS OF HAND: ICD-10-CM

## 2024-03-20 DIAGNOSIS — R76.8 ELEVATED ANTINUCLEAR ANTIBODY (ANA) LEVEL: ICD-10-CM

## 2024-03-20 PROCEDURE — 99214 OFFICE O/P EST MOD 30 MIN: CPT | Performed by: STUDENT IN AN ORGANIZED HEALTH CARE EDUCATION/TRAINING PROGRAM

## 2024-03-20 PROCEDURE — 1036F TOBACCO NON-USER: CPT | Performed by: STUDENT IN AN ORGANIZED HEALTH CARE EDUCATION/TRAINING PROGRAM

## 2024-03-20 PROCEDURE — 1159F MED LIST DOCD IN RCRD: CPT | Performed by: STUDENT IN AN ORGANIZED HEALTH CARE EDUCATION/TRAINING PROGRAM
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
## 2024-03-20 NOTE — PATIENT INSTRUCTIONS
See Dr Asha Blandon  Physiatry for your hamstring tightness and hand tightness and muscle pain    See hand therapy for your arthritis of hands    Continue the gabapentin    Try the flexeril as needeed; 5 - 10 mg up to three times a day

## 2024-04-09 ENCOUNTER — APPOINTMENT (OUTPATIENT)
Dept: ORTHOPEDIC SURGERY | Facility: HOSPITAL | Age: 74
End: 2024-04-09
Payer: MEDICARE

## 2024-05-03 ENCOUNTER — APPOINTMENT (OUTPATIENT)
Dept: CARDIOLOGY | Facility: HOSPITAL | Age: 74
End: 2024-05-03
Payer: MEDICARE

## 2024-05-03 ENCOUNTER — APPOINTMENT (OUTPATIENT)
Dept: RADIOLOGY | Facility: HOSPITAL | Age: 74
End: 2024-05-03
Payer: MEDICARE

## 2024-05-03 ENCOUNTER — HOSPITAL ENCOUNTER (OUTPATIENT)
Facility: HOSPITAL | Age: 74
Setting detail: OBSERVATION
Discharge: NURSING, REHAB OR LONG-TERM CARE | End: 2024-05-06
Attending: EMERGENCY MEDICINE | Admitting: INTERNAL MEDICINE
Payer: MEDICARE

## 2024-05-03 DIAGNOSIS — R53.1 WEAKNESS: Primary | ICD-10-CM

## 2024-05-03 DIAGNOSIS — R33.9 URINARY RETENTION: ICD-10-CM

## 2024-05-03 PROBLEM — K59.00 CONSTIPATION: Status: ACTIVE | Noted: 2024-05-03

## 2024-05-03 PROBLEM — R33.8 ACUTE URINARY RETENTION: Status: ACTIVE | Noted: 2024-05-03

## 2024-05-03 PROBLEM — R06.02 SHORTNESS OF BREATH: Status: ACTIVE | Noted: 2024-05-03

## 2024-05-03 PROBLEM — F41.9 ANXIETY: Status: ACTIVE | Noted: 2024-05-03

## 2024-05-03 PROBLEM — R26.2 UNABLE TO AMBULATE: Status: ACTIVE | Noted: 2024-05-03

## 2024-05-03 LAB
ALBUMIN SERPL BCP-MCNC: 4.6 G/DL (ref 3.4–5)
ALP SERPL-CCNC: 141 U/L (ref 33–136)
ALT SERPL W P-5'-P-CCNC: 18 U/L (ref 10–52)
ANION GAP SERPL CALC-SCNC: 21 MMOL/L (ref 10–20)
AST SERPL W P-5'-P-CCNC: 47 U/L (ref 9–39)
ATRIAL RATE: 108 BPM
BASOPHILS # BLD AUTO: 0.07 X10*3/UL (ref 0–0.1)
BASOPHILS NFR BLD AUTO: 0.6 %
BILIRUB SERPL-MCNC: 0.8 MG/DL (ref 0–1.2)
BUN SERPL-MCNC: 16 MG/DL (ref 6–23)
CALCIUM SERPL-MCNC: 9.6 MG/DL (ref 8.6–10.3)
CARDIAC TROPONIN I PNL SERPL HS: 7 NG/L (ref 0–20)
CHLORIDE SERPL-SCNC: 100 MMOL/L (ref 98–107)
CO2 SERPL-SCNC: 23 MMOL/L (ref 21–32)
CREAT SERPL-MCNC: 0.91 MG/DL (ref 0.5–1.3)
EGFRCR SERPLBLD CKD-EPI 2021: 90 ML/MIN/1.73M*2
EOSINOPHIL # BLD AUTO: 0.05 X10*3/UL (ref 0–0.4)
EOSINOPHIL NFR BLD AUTO: 0.5 %
ERYTHROCYTE [DISTWIDTH] IN BLOOD BY AUTOMATED COUNT: 13.2 % (ref 11.5–14.5)
GLUCOSE BLD MANUAL STRIP-MCNC: 138 MG/DL (ref 74–99)
GLUCOSE SERPL-MCNC: 102 MG/DL (ref 74–99)
HCT VFR BLD AUTO: 48.9 % (ref 41–52)
HGB BLD-MCNC: 16.9 G/DL (ref 13.5–17.5)
IMM GRANULOCYTES # BLD AUTO: 0.04 X10*3/UL (ref 0–0.5)
IMM GRANULOCYTES NFR BLD AUTO: 0.4 % (ref 0–0.9)
LYMPHOCYTES # BLD AUTO: 1.17 X10*3/UL (ref 0.8–3)
LYMPHOCYTES NFR BLD AUTO: 10.6 %
MCH RBC QN AUTO: 29 PG (ref 26–34)
MCHC RBC AUTO-ENTMCNC: 34.6 G/DL (ref 32–36)
MCV RBC AUTO: 84 FL (ref 80–100)
MONOCYTES # BLD AUTO: 0.97 X10*3/UL (ref 0.05–0.8)
MONOCYTES NFR BLD AUTO: 8.8 %
NEUTROPHILS # BLD AUTO: 8.76 X10*3/UL (ref 1.6–5.5)
NEUTROPHILS NFR BLD AUTO: 79.1 %
NRBC BLD-RTO: 0 /100 WBCS (ref 0–0)
P AXIS: 51 DEGREES
P OFFSET: 207 MS
P ONSET: 152 MS
PLATELET # BLD AUTO: 275 X10*3/UL (ref 150–450)
POTASSIUM SERPL-SCNC: 4.6 MMOL/L (ref 3.5–5.3)
PR INTERVAL: 134 MS
PROT SERPL-MCNC: 7.7 G/DL (ref 6.4–8.2)
Q ONSET: 219 MS
QRS COUNT: 18 BEATS
QRS DURATION: 90 MS
QT INTERVAL: 340 MS
QTC CALCULATION(BAZETT): 455 MS
QTC FREDERICIA: 413 MS
R AXIS: 37 DEGREES
RBC # BLD AUTO: 5.83 X10*6/UL (ref 4.5–5.9)
SODIUM SERPL-SCNC: 139 MMOL/L (ref 136–145)
T AXIS: 17 DEGREES
T OFFSET: 389 MS
VENTRICULAR RATE: 108 BPM
WBC # BLD AUTO: 11.1 X10*3/UL (ref 4.4–11.3)

## 2024-05-03 PROCEDURE — 71045 X-RAY EXAM CHEST 1 VIEW: CPT | Performed by: RADIOLOGY

## 2024-05-03 PROCEDURE — 96360 HYDRATION IV INFUSION INIT: CPT | Mod: 59

## 2024-05-03 PROCEDURE — 84075 ASSAY ALKALINE PHOSPHATASE: CPT | Performed by: EMERGENCY MEDICINE

## 2024-05-03 PROCEDURE — 2500000004 HC RX 250 GENERAL PHARMACY W/ HCPCS (ALT 636 FOR OP/ED): Performed by: EMERGENCY MEDICINE

## 2024-05-03 PROCEDURE — G0378 HOSPITAL OBSERVATION PER HR: HCPCS

## 2024-05-03 PROCEDURE — 99285 EMERGENCY DEPT VISIT HI MDM: CPT | Mod: 25

## 2024-05-03 PROCEDURE — 96361 HYDRATE IV INFUSION ADD-ON: CPT | Mod: 59

## 2024-05-03 PROCEDURE — 93005 ELECTROCARDIOGRAM TRACING: CPT

## 2024-05-03 PROCEDURE — 71275 CT ANGIOGRAPHY CHEST: CPT | Performed by: RADIOLOGY

## 2024-05-03 PROCEDURE — 84484 ASSAY OF TROPONIN QUANT: CPT | Performed by: EMERGENCY MEDICINE

## 2024-05-03 PROCEDURE — 71045 X-RAY EXAM CHEST 1 VIEW: CPT

## 2024-05-03 PROCEDURE — 2500000004 HC RX 250 GENERAL PHARMACY W/ HCPCS (ALT 636 FOR OP/ED): Performed by: PHYSICIAN ASSISTANT

## 2024-05-03 PROCEDURE — 71275 CT ANGIOGRAPHY CHEST: CPT

## 2024-05-03 PROCEDURE — 36415 COLL VENOUS BLD VENIPUNCTURE: CPT | Performed by: EMERGENCY MEDICINE

## 2024-05-03 PROCEDURE — 2550000001 HC RX 255 CONTRASTS: Performed by: EMERGENCY MEDICINE

## 2024-05-03 PROCEDURE — 74177 CT ABD & PELVIS W/CONTRAST: CPT | Performed by: RADIOLOGY

## 2024-05-03 PROCEDURE — 99223 1ST HOSP IP/OBS HIGH 75: CPT | Performed by: PHYSICIAN ASSISTANT

## 2024-05-03 PROCEDURE — 74177 CT ABD & PELVIS W/CONTRAST: CPT

## 2024-05-03 PROCEDURE — 2500000001 HC RX 250 WO HCPCS SELF ADMINISTERED DRUGS (ALT 637 FOR MEDICARE OP): Performed by: PHYSICIAN ASSISTANT

## 2024-05-03 PROCEDURE — 82947 ASSAY GLUCOSE BLOOD QUANT: CPT | Mod: 59

## 2024-05-03 PROCEDURE — 2500000006 HC RX 250 W HCPCS SELF ADMINISTERED DRUGS (ALT 637 FOR ALL PAYERS): Mod: MUE | Performed by: PHYSICIAN ASSISTANT

## 2024-05-03 PROCEDURE — 85025 COMPLETE CBC W/AUTO DIFF WBC: CPT | Performed by: EMERGENCY MEDICINE

## 2024-05-03 RX ORDER — ACETAMINOPHEN 650 MG/1
650 SUPPOSITORY RECTAL EVERY 4 HOURS PRN
Status: DISCONTINUED | OUTPATIENT
Start: 2024-05-03 | End: 2024-05-06 | Stop reason: HOSPADM

## 2024-05-03 RX ORDER — QUETIAPINE FUMARATE 100 MG/1
200 TABLET, FILM COATED ORAL NIGHTLY
Status: DISCONTINUED | OUTPATIENT
Start: 2024-05-03 | End: 2024-05-06 | Stop reason: HOSPADM

## 2024-05-03 RX ORDER — SODIUM CHLORIDE, SODIUM LACTATE, POTASSIUM CHLORIDE, CALCIUM CHLORIDE 600; 310; 30; 20 MG/100ML; MG/100ML; MG/100ML; MG/100ML
100 INJECTION, SOLUTION INTRAVENOUS CONTINUOUS
Status: DISCONTINUED | OUTPATIENT
Start: 2024-05-03 | End: 2024-05-06

## 2024-05-03 RX ORDER — ONDANSETRON HYDROCHLORIDE 2 MG/ML
4 INJECTION, SOLUTION INTRAVENOUS EVERY 8 HOURS PRN
Status: DISCONTINUED | OUTPATIENT
Start: 2024-05-03 | End: 2024-05-06 | Stop reason: HOSPADM

## 2024-05-03 RX ORDER — ACETAMINOPHEN 325 MG/1
650 TABLET ORAL EVERY 4 HOURS PRN
Status: DISCONTINUED | OUTPATIENT
Start: 2024-05-03 | End: 2024-05-06 | Stop reason: HOSPADM

## 2024-05-03 RX ORDER — AMOXICILLIN 250 MG
2 CAPSULE ORAL 2 TIMES DAILY
Status: DISCONTINUED | OUTPATIENT
Start: 2024-05-03 | End: 2024-05-06 | Stop reason: HOSPADM

## 2024-05-03 RX ORDER — ENOXAPARIN SODIUM 100 MG/ML
40 INJECTION SUBCUTANEOUS EVERY 24 HOURS
Status: DISCONTINUED | OUTPATIENT
Start: 2024-05-03 | End: 2024-05-06 | Stop reason: HOSPADM

## 2024-05-03 RX ORDER — MULTIVIT-MIN/IRON FUM/FOLIC AC 7.5 MG-4
1 TABLET ORAL DAILY
Status: DISCONTINUED | OUTPATIENT
Start: 2024-05-04 | End: 2024-05-06 | Stop reason: HOSPADM

## 2024-05-03 RX ORDER — PANTOPRAZOLE SODIUM 40 MG/10ML
40 INJECTION, POWDER, LYOPHILIZED, FOR SOLUTION INTRAVENOUS
Status: DISCONTINUED | OUTPATIENT
Start: 2024-05-04 | End: 2024-05-06 | Stop reason: HOSPADM

## 2024-05-03 RX ORDER — LAMOTRIGINE 25 MG/1
50 TABLET ORAL 2 TIMES DAILY
COMMUNITY

## 2024-05-03 RX ORDER — ACETAMINOPHEN 160 MG/5ML
650 SOLUTION ORAL EVERY 4 HOURS PRN
Status: DISCONTINUED | OUTPATIENT
Start: 2024-05-03 | End: 2024-05-06 | Stop reason: HOSPADM

## 2024-05-03 RX ORDER — TALC
3 POWDER (GRAM) TOPICAL NIGHTLY PRN
Status: DISCONTINUED | OUTPATIENT
Start: 2024-05-03 | End: 2024-05-06 | Stop reason: HOSPADM

## 2024-05-03 RX ORDER — POLYETHYLENE GLYCOL 3350 17 G/17G
17 POWDER, FOR SOLUTION ORAL DAILY
Status: DISCONTINUED | OUTPATIENT
Start: 2024-05-04 | End: 2024-05-04

## 2024-05-03 RX ORDER — HYDROXYZINE HYDROCHLORIDE 25 MG/1
25 TABLET, FILM COATED ORAL EVERY 8 HOURS PRN
Status: DISCONTINUED | OUTPATIENT
Start: 2024-05-03 | End: 2024-05-06 | Stop reason: HOSPADM

## 2024-05-03 RX ORDER — CHOLECALCIFEROL (VITAMIN D3) 25 MCG
5000 TABLET ORAL DAILY
Status: DISCONTINUED | OUTPATIENT
Start: 2024-05-04 | End: 2024-05-06 | Stop reason: HOSPADM

## 2024-05-03 RX ORDER — ONDANSETRON 4 MG/1
4 TABLET, ORALLY DISINTEGRATING ORAL EVERY 8 HOURS PRN
Status: DISCONTINUED | OUTPATIENT
Start: 2024-05-03 | End: 2024-05-06 | Stop reason: HOSPADM

## 2024-05-03 RX ORDER — LAMOTRIGINE 25 MG/1
50 TABLET ORAL 2 TIMES DAILY
Status: DISCONTINUED | OUTPATIENT
Start: 2024-05-03 | End: 2024-05-06 | Stop reason: HOSPADM

## 2024-05-03 RX ORDER — PANTOPRAZOLE SODIUM 40 MG/1
40 TABLET, DELAYED RELEASE ORAL
Status: DISCONTINUED | OUTPATIENT
Start: 2024-05-04 | End: 2024-05-06 | Stop reason: HOSPADM

## 2024-05-03 RX ADMIN — QUETIAPINE FUMARATE 200 MG: 100 TABLET ORAL at 22:08

## 2024-05-03 RX ADMIN — SODIUM CHLORIDE 1000 ML: 9 INJECTION, SOLUTION INTRAVENOUS at 12:08

## 2024-05-03 RX ADMIN — SENNOSIDES AND DOCUSATE SODIUM 2 TABLET: 8.6; 5 TABLET ORAL at 22:08

## 2024-05-03 RX ADMIN — LAMOTRIGINE 50 MG: 25 TABLET ORAL at 22:08

## 2024-05-03 RX ADMIN — SODIUM CHLORIDE, POTASSIUM CHLORIDE, SODIUM LACTATE AND CALCIUM CHLORIDE 100 ML/HR: 600; 310; 30; 20 INJECTION, SOLUTION INTRAVENOUS at 22:09

## 2024-05-03 RX ADMIN — ACETAMINOPHEN 650 MG: 325 TABLET ORAL at 22:08

## 2024-05-03 RX ADMIN — Medication 3 MG: at 22:08

## 2024-05-03 RX ADMIN — IOHEXOL 75 ML: 350 INJECTION, SOLUTION INTRAVENOUS at 17:28

## 2024-05-03 SDOH — SOCIAL STABILITY: SOCIAL INSECURITY: HAVE YOU HAD ANY THOUGHTS OF HARMING ANYONE ELSE?: NO

## 2024-05-03 SDOH — SOCIAL STABILITY: SOCIAL INSECURITY: HAS ANYONE EVER THREATENED TO HURT YOUR FAMILY OR YOUR PETS?: NO

## 2024-05-03 SDOH — SOCIAL STABILITY: SOCIAL INSECURITY: DO YOU FEEL ANYONE HAS EXPLOITED OR TAKEN ADVANTAGE OF YOU FINANCIALLY OR OF YOUR PERSONAL PROPERTY?: NO

## 2024-05-03 SDOH — SOCIAL STABILITY: SOCIAL INSECURITY: WERE YOU ABLE TO COMPLETE ALL THE BEHAVIORAL HEALTH SCREENINGS?: YES

## 2024-05-03 SDOH — SOCIAL STABILITY: SOCIAL INSECURITY: HAVE YOU HAD THOUGHTS OF HARMING ANYONE ELSE?: NO

## 2024-05-03 SDOH — SOCIAL STABILITY: SOCIAL INSECURITY: DO YOU FEEL UNSAFE GOING BACK TO THE PLACE WHERE YOU ARE LIVING?: NO

## 2024-05-03 SDOH — SOCIAL STABILITY: SOCIAL INSECURITY: ARE YOU OR HAVE YOU BEEN THREATENED OR ABUSED PHYSICALLY, EMOTIONALLY, OR SEXUALLY BY ANYONE?: NO

## 2024-05-03 SDOH — SOCIAL STABILITY: SOCIAL INSECURITY: ABUSE: ADULT

## 2024-05-03 SDOH — SOCIAL STABILITY: SOCIAL INSECURITY: DOES ANYONE TRY TO KEEP YOU FROM HAVING/CONTACTING OTHER FRIENDS OR DOING THINGS OUTSIDE YOUR HOME?: NO

## 2024-05-03 SDOH — SOCIAL STABILITY: SOCIAL INSECURITY: ARE THERE ANY APPARENT SIGNS OF INJURIES/BEHAVIORS THAT COULD BE RELATED TO ABUSE/NEGLECT?: NO

## 2024-05-03 ASSESSMENT — COLUMBIA-SUICIDE SEVERITY RATING SCALE - C-SSRS
6. HAVE YOU EVER DONE ANYTHING, STARTED TO DO ANYTHING, OR PREPARED TO DO ANYTHING TO END YOUR LIFE?: NO
1. IN THE PAST MONTH, HAVE YOU WISHED YOU WERE DEAD OR WISHED YOU COULD GO TO SLEEP AND NOT WAKE UP?: NO
2. HAVE YOU ACTUALLY HAD ANY THOUGHTS OF KILLING YOURSELF?: NO

## 2024-05-03 ASSESSMENT — COGNITIVE AND FUNCTIONAL STATUS - GENERAL
MOBILITY SCORE: 21
CLIMB 3 TO 5 STEPS WITH RAILING: A LOT
WALKING IN HOSPITAL ROOM: A LITTLE
PATIENT BASELINE BEDBOUND: NO
DAILY ACTIVITIY SCORE: 24

## 2024-05-03 ASSESSMENT — ACTIVITIES OF DAILY LIVING (ADL)
DRESSING YOURSELF: INDEPENDENT
ADEQUATE_TO_COMPLETE_ADL: YES
HEARING - LEFT EAR: FUNCTIONAL
FEEDING YOURSELF: INDEPENDENT
TOILETING: INDEPENDENT
PATIENT'S MEMORY ADEQUATE TO SAFELY COMPLETE DAILY ACTIVITIES?: YES
BATHING: INDEPENDENT
GROOMING: INDEPENDENT
HEARING - RIGHT EAR: FUNCTIONAL
LACK_OF_TRANSPORTATION: NO
WALKS IN HOME: NEEDS ASSISTANCE
JUDGMENT_ADEQUATE_SAFELY_COMPLETE_DAILY_ACTIVITIES: YES
ASSISTIVE_DEVICE: WALKER

## 2024-05-03 ASSESSMENT — PAIN SCALES - GENERAL
PAINLEVEL_OUTOF10: 5 - MODERATE PAIN
PAINLEVEL_OUTOF10: 0 - NO PAIN
PAINLEVEL_OUTOF10: 0 - NO PAIN

## 2024-05-03 ASSESSMENT — PATIENT HEALTH QUESTIONNAIRE - PHQ9
1. LITTLE INTEREST OR PLEASURE IN DOING THINGS: NOT AT ALL
SUM OF ALL RESPONSES TO PHQ9 QUESTIONS 1 & 2: 0
2. FEELING DOWN, DEPRESSED OR HOPELESS: NOT AT ALL

## 2024-05-03 ASSESSMENT — LIFESTYLE VARIABLES
HOW OFTEN DO YOU HAVE 6 OR MORE DRINKS ON ONE OCCASION: NEVER
AUDIT-C TOTAL SCORE: 0
SKIP TO QUESTIONS 9-10: 1
AUDIT-C TOTAL SCORE: 0
HOW OFTEN DO YOU HAVE A DRINK CONTAINING ALCOHOL: NEVER
HOW MANY STANDARD DRINKS CONTAINING ALCOHOL DO YOU HAVE ON A TYPICAL DAY: PATIENT DOES NOT DRINK

## 2024-05-03 ASSESSMENT — PAIN - FUNCTIONAL ASSESSMENT
PAIN_FUNCTIONAL_ASSESSMENT: 0-10

## 2024-05-03 NOTE — PROGRESS NOTES
Transitional Care Coordination Progress Note:  Plan per Medical/Surgical team: treatment of back pain & weakness X 3 months with IV fluids & CTSCANs pending  Status: ED  Payor source: [facility]  Discharge disposition: Home alone, active with outpatient therapy 2x week in [place]   Potential Barriers: HR improved 121 to 100  ADOD: 5/4/2024  PINKY Monae RN, BSN Transitional Care Coordinator ED# [phone]      05/03/24 1451   Discharge Planning   Living Arrangements Alone   Support Systems Children   Assistance Needed pain management   Type of Residence Private residence   Number of Stairs to Enter Residence 3   Number of Stairs Within Residence 14   Do you have animals or pets at home? No   Home or Post Acute Services Community services   Patient expects to be discharged to: Home alone, active with outpatient therapy 2x week in [place]   Does the patient need discharge transport arranged? Yes   RoundTrip coordination needed? Yes   Has discharge transport been arranged? No   Financial Resource Strain   How hard is it for you to pay for the very basics like food, housing, medical care, and heating? Not hard   Housing Stability   In the last 12 months, was there a time when you were not able to pay the mortgage or rent on time? N   In the last 12 months, how many places have you lived? 1   In the last 12 months, was there a time when you did not have a steady place to sleep or slept in a shelter (including now)? N   Transportation Needs   In the past 12 months, has lack of transportation kept you from medical appointments or from getting medications? no   In the past 12 months, has lack of transportation kept you from meetings, work, or from getting things needed for daily living? No

## 2024-05-03 NOTE — PROGRESS NOTES
Home alone, active with outpatient therapy 2x week in [place]      05/03/24 0059   Current Planned Discharge Disposition   Current Planned Discharge Disposition Home

## 2024-05-03 NOTE — ED NOTES
Community Resource Name: List of  primary care physicians  Phone Number:   Staff Member:      Discussed the following topics on behalf of the patient:  []  Behavioral Health Assistance     []  Case Management  []   Assistance  []  Digital Equity Assistance  []  Dental Health Assistance  []  Education Assistance  []  Employment Assistance  []  Financial Strain Relief Assistance  []  Food Insecurity Assistance  [x]  Healthcare Coverage Assistance  []  Housing Stability Assistance  []  IP Violence Relief Assistance  []  Legal Assistance  []  Physical Activity Assistance  []  Social Connection Assistance  []  Stress Relief Assistance   []  Substance Abuse Assistance  []  Transportation Assistance  []  Utility Assistance  [x]  Other: [insert comment here]  Patient wanted a new primary care physician.  Next Steps:         NAIMA Alfaro  CHW was informed by [facility] that patient wanted a new primary care physicians. CHW asked the patient if he would be interested in a list of  physicians to choose from for future services. Patient agreed and was given a list of  physicians, they are accepting new patients, they are taking his insurance([employer]), and they are in the area where he lives. Patient expressed appreciation.        NAIMA Alfaro  05/03/24 1213

## 2024-05-03 NOTE — PROGRESS NOTES
"   05/03/24 1450   [facility] Disability Status   Are you deaf or do you have serious difficulty hearing? N   Are you blind or do you have serious difficulty seeing, even when wearing glasses? N   Because of a physical, mental, or emotional condition, do you have serious difficulty concentrating, remembering, or making decisions? (5 years old or older) Y  (anxiety)   Do you have serious difficulty walking or climbing stairs? N  (states \"stiffness\" & chronic back pain, ambulates without aides)   Do you have serious difficulty dressing or bathing? N   Because of a physical, mental, or emotional condition, do you have serious difficulty doing errands alone such as visiting the doctor? N       "

## 2024-05-03 NOTE — ED PROVIDER NOTES
HPI   Chief Complaint   Patient presents with    Shortness of Breath     Pt. Arrives bt EMS from home. Per pt. He feels like he is more weak in bilateral leges and his balance is of. He also states that his breathing has not be right the last four days. Pt. States that he has not eating and drinking as much as normal. Pt. Denies any chest pain or reset falls     Weakness, Gen       Chief complaint: Weakness    History of present illness: Patient is a 73-year-old male with history of depression DVT polymyalgia rheumatica and hypercholesterolemia presenting to the emergency department with complaints of weakness.  According to the patient, he states that over the last 4 days he has been feeling increasingly weak.  The patient states that it feels like his breathing is not quite right during this period of time the patient missed to having a cough.  Patient states that his he has had a decrease in his p.o. intake during this period of time as the patient is feeling generally unwell, the patient presents to the emergency department for further evaluation he denies any ezekiel pain at this time but states that he feels ill.      History provided by:  Patient   used: No                        No data recorded                   Patient History   No past medical history on file.  No past surgical history on file.  No family history on file.  Social History     Tobacco Use    Smoking status: Former     Types: Cigarettes     Passive exposure: Past    Smokeless tobacco: Never   Substance Use Topics    Alcohol use: Defer    Drug use: Never       Physical Exam   ED Triage Vitals [05/03/24 1017]   Temperature Heart Rate Respirations BP   36.2 °C (97.2 °F) (!) 116 20 (!) 146/96      Pulse Ox Temp src Heart Rate Source Patient Position   97 % -- -- --      BP Location FiO2 (%)     -- --       Physical Exam  Vitals and nursing note reviewed.   Constitutional:       General: He is not in acute distress.      Appearance: He is well-developed.   HENT:      Head: Normocephalic and atraumatic.   Eyes:      Conjunctiva/sclera: Conjunctivae normal.   Cardiovascular:      Rate and Rhythm: Normal rate and regular rhythm.      Heart sounds: No murmur heard.  Pulmonary:      Effort: Pulmonary effort is normal. No respiratory distress.      Breath sounds: Normal breath sounds.   Abdominal:      Palpations: Abdomen is soft.      Tenderness: There is abdominal tenderness in the suprapubic area.   Musculoskeletal:         General: No swelling.      Cervical back: Neck supple.   Skin:     General: Skin is warm and dry.      Capillary Refill: Capillary refill takes less than 2 seconds.   Neurological:      Mental Status: He is alert.   Psychiatric:         Mood and Affect: Mood normal.         ED Course & MDM   Diagnoses as of 05/04/24 1040   Weakness   Urinary retention       Medical Decision Making  Medical decision making: Patient hope stable throughout his time in the emergency department.  CBC demonstrated no significant abnormalities the patient's Chem-7 and LFTs were all within normal limits the patient's troponin was normal.  Chest x-ray demonstrated no significant abnormalities.  CAT scan of the patient's abdomen and pelvis with IV contrast demonstrated only a large colonic stool burden while CT angiography of the patient's chest for pulmonary embolus demonstrated no significant abnormalities.  The patient's EKG demonstrated a sinus tachycardia with a rate of 108 bpm isoelectric ST segments narrow QRS complexes and a QTc of 455.    Patient presents to the emergency department with complaints of weakness and shortness of breath.  Workup was performed as above and demonstrated no significant abnormalities.  At this time, the patient cannot be safely discharged home due to his level of weakness and as result, the patient will require admission to the hospital.  The patient was noted to have urinary retention during his time in  the emergency department with a bladder scan that demonstrated a bladder size of greater than 500 mL however, the patient declined Vu catheter placement.  I spoke with the hospitalist regarding this patient's case they agreed the patient could be admitted to their service for further evaluation of therapy.  The patient was admitted to hospital in otherwise stable condition.    Amount and/or Complexity of Data Reviewed  Labs: ordered. Decision-making details documented in ED Course.  Radiology: ordered. Decision-making details documented in ED Course.  ECG/medicine tests: ordered and independent interpretation performed. Decision-making details documented in ED Course.        Procedure  Procedures     Mitesh Redd MD  05/10/24 3400

## 2024-05-04 LAB
ANION GAP SERPL CALC-SCNC: 14 MMOL/L (ref 10–20)
APPEARANCE UR: CLEAR
BILIRUB UR STRIP.AUTO-MCNC: NEGATIVE MG/DL
BUN SERPL-MCNC: 17 MG/DL (ref 6–23)
CALCIUM SERPL-MCNC: 8.7 MG/DL (ref 8.6–10.3)
CHLORIDE SERPL-SCNC: 103 MMOL/L (ref 98–107)
CO2 SERPL-SCNC: 23 MMOL/L (ref 21–32)
COLOR UR: YELLOW
CREAT SERPL-MCNC: 0.76 MG/DL (ref 0.5–1.3)
EGFRCR SERPLBLD CKD-EPI 2021: >90 ML/MIN/1.73M*2
ERYTHROCYTE [DISTWIDTH] IN BLOOD BY AUTOMATED COUNT: 13.2 % (ref 11.5–14.5)
GLUCOSE SERPL-MCNC: 136 MG/DL (ref 74–99)
GLUCOSE UR STRIP.AUTO-MCNC: NORMAL MG/DL
HCT VFR BLD AUTO: 40.3 % (ref 41–52)
HGB BLD-MCNC: 13.8 G/DL (ref 13.5–17.5)
KETONES UR STRIP.AUTO-MCNC: ABNORMAL MG/DL
LEUKOCYTE ESTERASE UR QL STRIP.AUTO: NEGATIVE
MCH RBC QN AUTO: 28.9 PG (ref 26–34)
MCHC RBC AUTO-ENTMCNC: 34.2 G/DL (ref 32–36)
MCV RBC AUTO: 85 FL (ref 80–100)
MUCOUS THREADS #/AREA URNS AUTO: NORMAL /LPF
NITRITE UR QL STRIP.AUTO: NEGATIVE
NRBC BLD-RTO: 0 /100 WBCS (ref 0–0)
PH UR STRIP.AUTO: 6 [PH]
PLATELET # BLD AUTO: 227 X10*3/UL (ref 150–450)
POTASSIUM SERPL-SCNC: 3.6 MMOL/L (ref 3.5–5.3)
PROT UR STRIP.AUTO-MCNC: ABNORMAL MG/DL
RBC # BLD AUTO: 4.77 X10*6/UL (ref 4.5–5.9)
RBC # UR STRIP.AUTO: NEGATIVE /UL
RBC #/AREA URNS AUTO: NORMAL /HPF
SODIUM SERPL-SCNC: 136 MMOL/L (ref 136–145)
SP GR UR STRIP.AUTO: 1.03
UROBILINOGEN UR STRIP.AUTO-MCNC: NORMAL MG/DL
WBC # BLD AUTO: 7.8 X10*3/UL (ref 4.4–11.3)
WBC #/AREA URNS AUTO: NORMAL /HPF

## 2024-05-04 PROCEDURE — 2500000006 HC RX 250 W HCPCS SELF ADMINISTERED DRUGS (ALT 637 FOR ALL PAYERS): Performed by: INTERNAL MEDICINE

## 2024-05-04 PROCEDURE — 2500000006 HC RX 250 W HCPCS SELF ADMINISTERED DRUGS (ALT 637 FOR ALL PAYERS): Performed by: PHYSICIAN ASSISTANT

## 2024-05-04 PROCEDURE — 85027 COMPLETE CBC AUTOMATED: CPT | Performed by: PHYSICIAN ASSISTANT

## 2024-05-04 PROCEDURE — 2500000004 HC RX 250 GENERAL PHARMACY W/ HCPCS (ALT 636 FOR OP/ED)

## 2024-05-04 PROCEDURE — 36415 COLL VENOUS BLD VENIPUNCTURE: CPT | Performed by: PHYSICIAN ASSISTANT

## 2024-05-04 PROCEDURE — 97161 PT EVAL LOW COMPLEX 20 MIN: CPT | Mod: GP

## 2024-05-04 PROCEDURE — G0378 HOSPITAL OBSERVATION PER HR: HCPCS

## 2024-05-04 PROCEDURE — 2500000004 HC RX 250 GENERAL PHARMACY W/ HCPCS (ALT 636 FOR OP/ED): Performed by: PHYSICIAN ASSISTANT

## 2024-05-04 PROCEDURE — 2500000001 HC RX 250 WO HCPCS SELF ADMINISTERED DRUGS (ALT 637 FOR MEDICARE OP): Performed by: PHYSICIAN ASSISTANT

## 2024-05-04 PROCEDURE — 80048 BASIC METABOLIC PNL TOTAL CA: CPT | Performed by: PHYSICIAN ASSISTANT

## 2024-05-04 PROCEDURE — 81001 URINALYSIS AUTO W/SCOPE: CPT | Performed by: INTERNAL MEDICINE

## 2024-05-04 RX ORDER — TAMSULOSIN HYDROCHLORIDE 0.4 MG/1
0.4 CAPSULE ORAL DAILY
Status: DISCONTINUED | OUTPATIENT
Start: 2024-05-04 | End: 2024-05-06 | Stop reason: HOSPADM

## 2024-05-04 RX ORDER — POLYETHYLENE GLYCOL 3350 17 G/17G
17 POWDER, FOR SOLUTION ORAL 2 TIMES DAILY
Status: DISCONTINUED | OUTPATIENT
Start: 2024-05-04 | End: 2024-05-06 | Stop reason: HOSPADM

## 2024-05-04 RX ADMIN — PANTOPRAZOLE SODIUM 40 MG: 40 TABLET, DELAYED RELEASE ORAL at 06:03

## 2024-05-04 RX ADMIN — LAMOTRIGINE 50 MG: 25 TABLET ORAL at 20:06

## 2024-05-04 RX ADMIN — SODIUM CHLORIDE, POTASSIUM CHLORIDE, SODIUM LACTATE AND CALCIUM CHLORIDE 100 ML/HR: 600; 310; 30; 20 INJECTION, SOLUTION INTRAVENOUS at 19:25

## 2024-05-04 RX ADMIN — Medication 5000 UNITS: at 08:13

## 2024-05-04 RX ADMIN — POLYETHYLENE GLYCOL 3350 17 G: 17 POWDER, FOR SOLUTION ORAL at 20:06

## 2024-05-04 RX ADMIN — MULTIPLE VITAMINS W/ MINERALS TAB 1 TABLET: TAB at 08:12

## 2024-05-04 RX ADMIN — TAMSULOSIN HYDROCHLORIDE 0.4 MG: 0.4 CAPSULE ORAL at 00:19

## 2024-05-04 RX ADMIN — SENNOSIDES AND DOCUSATE SODIUM 2 TABLET: 8.6; 5 TABLET ORAL at 20:05

## 2024-05-04 RX ADMIN — QUETIAPINE FUMARATE 200 MG: 100 TABLET ORAL at 20:05

## 2024-05-04 RX ADMIN — LAMOTRIGINE 50 MG: 25 TABLET ORAL at 08:13

## 2024-05-04 ASSESSMENT — COGNITIVE AND FUNCTIONAL STATUS - GENERAL
CLIMB 3 TO 5 STEPS WITH RAILING: A LITTLE
STANDING UP FROM CHAIR USING ARMS: A LITTLE
WALKING IN HOSPITAL ROOM: A LITTLE
MOBILITY SCORE: 19
WALKING IN HOSPITAL ROOM: A LITTLE
DAILY ACTIVITIY SCORE: 23
STANDING UP FROM CHAIR USING ARMS: A LITTLE
MOVING TO AND FROM BED TO CHAIR: A LITTLE
MOBILITY SCORE: 19
CLIMB 3 TO 5 STEPS WITH RAILING: A LOT
MOBILITY SCORE: 21
CLIMB 3 TO 5 STEPS WITH RAILING: A LOT
MOVING TO AND FROM BED TO CHAIR: A LITTLE
DRESSING REGULAR LOWER BODY CLOTHING: A LITTLE
STANDING UP FROM CHAIR USING ARMS: A LITTLE
WALKING IN HOSPITAL ROOM: A LITTLE

## 2024-05-04 ASSESSMENT — PAIN SCALES - GENERAL
PAINLEVEL_OUTOF10: 0 - NO PAIN
PAINLEVEL_OUTOF10: 6
PAINLEVEL_OUTOF10: 0 - NO PAIN
PAINLEVEL_OUTOF10: 0 - NO PAIN

## 2024-05-04 ASSESSMENT — VISUAL ACUITY: OU: 1

## 2024-05-04 ASSESSMENT — PAIN - FUNCTIONAL ASSESSMENT
PAIN_FUNCTIONAL_ASSESSMENT: 0-10

## 2024-05-04 ASSESSMENT — PAIN DESCRIPTION - DESCRIPTORS: DESCRIPTORS: ACHING

## 2024-05-04 ASSESSMENT — ACTIVITIES OF DAILY LIVING (ADL): ADL_ASSISTANCE: INDEPENDENT

## 2024-05-04 NOTE — NURSING NOTE
Educated patient on the importance of the indwelling catheter- pt refused/pt states that if he does not pee by the morning then he might allow us to put the gilmore in

## 2024-05-04 NOTE — PROGRESS NOTES
05/04/24 1535   Discharge Planning   Patient expects to be discharged to: notified by SEBASTIAN-CNP  on duty  patient rec Mod intensity for further rehabilitation; OT REC pending-will need auth

## 2024-05-04 NOTE — CARE PLAN
The patient's goals for the shift include      Problem: Pain - Adult  Goal: Verbalizes/displays adequate comfort level or baseline comfort level  Outcome: Progressing     Problem: Discharge Planning  Goal: Discharge to home or other facility with appropriate resources  Outcome: Progressing       The clinical goals for the shift include     Problem: Chronic Conditions and Co-morbidities  Goal: Patient's chronic conditions and co-morbidity symptoms are monitored and maintained or improved  Outcome: Progressing     Problem: Skin  Goal: Participates in plan/prevention/treatment measures  Outcome: Progressing     Problem: Skin  Goal: Prevent/manage excess moisture  Outcome: Progressing     Problem: Skin  Goal: Prevent/minimize sheer/friction injuries  Outcome: Progressing     Problem: Fall/Injury  Goal: Be free from injury by end of the shift  Outcome: Progressing     Problem: Pain  Goal: Performs ADL's with improved pain control throughout shift  Outcome: Progressing

## 2024-05-04 NOTE — PROGRESS NOTES
Physical Therapy    Physical Therapy Evaluation    Patient Name: Ryan Rasheed  MRN: [id number]  Today's Date: 5/4/2024   Time Calculation  Start Time: 1159  Stop Time: 1220  Time Calculation (min): 21 min    Assessment/Plan   PT Assessment  PT Assessment Results: Decreased strength, Impaired balance, Decreased mobility, Pain  Rehab Prognosis: Good  Barriers to Discharge: none  Evaluation/Treatment Tolerance: Patient tolerated treatment well  Medical Staff Made Aware: Yes  Strengths: Premorbid level of function  Barriers to Participation: Comorbidities  End of Session Communication: Bedside nurse  Assessment Comment: Pt presenting with impaired mobility. Pt would benefit from moderate intensity PT to maximize functional mobility, safety and independence.  End of Session Patient Position: Bed, 3 rail up, Alarm on (Pt sitting at EOB with alarm on. RN made aware. Pt instructed not to get up without assistance at this time.)  IP OR SWING BED PT PLAN  Inpatient or Swing Bed: Inpatient  PT Plan  Treatment/Interventions: Gait training, Stair training, Balance training, Therapeutic exercise, Therapeutic activity  PT Plan: Skilled PT  PT Frequency: 3 times per week  PT Discharge Recommendations: Moderate intensity level of continued care  Equipment Recommended upon Discharge: Wheeled walker  PT Recommended Transfer Status: Contact guard  PT - OK to Discharge: Yes (Per POC)      Subjective   General Visit Information:  General  Reason for Referral: 73 year old male admitted with bilateral leg weakness and unable to ambulate  Referred By: Elana HARRIS  Past Medical History Relevant to Rehab: anxiety, SOB, tachycardia, urinary retention, bipolar disorder, depression, anxiety, panic attacks, ulcerative colitis, ADHD, alcohol dependence  Family/Caregiver Present: No  Prior to Session Communication: Bedside nurse  Patient Position Received: Bed, 3 rail up, Alarm off, not on at start of session  Preferred Learning Style: auditory,  verbal, visual  General Comment: Pt agreeable to PT  Home Living:  Home Living  Type of Home: House  Lives With: Alone  Home Adaptive Equipment: None  Home Layout: Two level, Other (Comment) (bedroom and bathroom on second floor, 8 stairs to access with rail on right for first half and then left for other half)  Home Access: Stairs to enter with rails  Entrance Stairs-Rails: Left  Entrance Stairs-Number of Steps: 4  Bathroom Shower/Tub: Walk-in shower  Bathroom Toilet: Standard  Bathroom Equipment: None  Prior Level of Function:  Prior Function Per Pt/Caregiver Report  Level of [place]: Independent with ADLs and functional transfers, Independent with homemaking with ambulation  Receives Help From: Other (Comment) (does not have anyone who can help)  ADL Assistance: Independent  Homemaking Assistance: Independent  Ambulatory Assistance: Independent  Precautions:  Precautions  Medical Precautions: Fall precautions  Vital Signs:       Objective   Pain:  Pain Assessment  Pain Assessment: 0-10  Pain Score: 6  Pain Type: Acute pain  Pain Location: Foot  Pain Orientation: Right, Left  Pain Descriptors: Aching  Cognition:  Cognition  Overall Cognitive Status: Within Functional Limits  Orientation Level: Oriented X4  Insight: Mild  Impulsive: Within functional limits    General Assessments:  General Observation  General Observation: No apparent distress               Activity Tolerance  Endurance: Endurance does not limit participation in activity    Sensation  Light Touch: No apparent deficits    Strength  Strength Comments: Bilat LEs grossly 4+/5 as evidenced by function  Strength  Strength Comments: Bilat LEs grossly 4+/5 as evidenced by function    Perception  Inattention/Neglect: Appears intact      Coordination  Movements are Fluid and Coordinated: No  Coordination Comment: Slight ataxia noted with gait and tremors UE/LE bilat    Postural Control  Postural Control: Impaired  Posture Comment: forward head posture  and rounded shoulders    Static Sitting Balance  Static Sitting-Balance Support: Feet supported  Static Sitting-Level of Assistance: Modified independent       Functional Assessments:       Bed Mobility  Bed Mobility: Yes  Bed Mobility 1  Bed Mobility 1: Supine to sitting  Level of Assistance 1: Modified independent    Transfers  Transfer: Yes  Transfer 1  Technique 1: Sit to stand, Stand to sit  Transfer Device 1: Walker  Transfer Level of Assistance 1: Modified independent  Trials/Comments 1: Cueing for hand placement    Ambulation/Gait Training  Ambulation/Gait Training Performed: Yes  Ambulation/Gait Training 1  Surface 1: Level tile  Device 1: Rolling walker  Assistance 1: Contact guard  Quality of Gait 1: Shuffling gait, Decreased step length, Ataxic  Comments/Distance (ft) 1: Decreased brandon and decreased step length bilaterally. Pt ambulated 80 feet with FWW and CG            Extremity/Trunk Assessments:  RLE   RLE : Within Functional Limits  LLE   LLE : Within Functional Limits  Outcome Measures:  [facility] Basic Mobility  Turning from your back to your side while in a flat bed without using bedrails: None  Moving from lying on your back to sitting on the side of a flat bed without using bedrails: None  Moving to and from bed to chair (including a wheelchair): A little  Standing up from a chair using your arms (e.g. wheelchair or bedside chair): A little  To walk in hospital room: A little  Climbing 3-5 steps with railing: A lot  Basic Mobility - Total Score: 19    Encounter Problems       Encounter Problems (Active)       Mobility       STG - Patient will ambulate 200 feet independently with a device       Start:  05/04/24    Expected End:  05/18/24            STG - Patient will ascend and descend a flight of stairs while holding one rail independently       Start:  05/04/24    Expected End:  05/18/24               PT Transfers       STG - Transfer from bed to chair independently       Start:  05/04/24     Expected End:  05/18/24            STG - Patient will transfer sit to and from stand independently       Start:  05/04/24    Expected End:  05/18/24               Pain - Adult              Education Documentation  Handouts, taught by Mary Jane Claros PT at 5/4/2024 12:53 PM.  Learner: Patient  Readiness: Acceptance  Method: Explanation, Demonstration  Response: Verbalizes Understanding, Demonstrated Understanding, Needs Reinforcement    Precautions, taught by Mary Jane Claros PT at 5/4/2024 12:53 PM.  Learner: Patient  Readiness: Acceptance  Method: Explanation, Demonstration  Response: Verbalizes Understanding, Demonstrated Understanding, Needs Reinforcement    Body Mechanics, taught by Mary Jane Claros PT at 5/4/2024 12:53 PM.  Learner: Patient  Readiness: Acceptance  Method: Explanation, Demonstration  Response: Verbalizes Understanding, Demonstrated Understanding, Needs Reinforcement    Home Exercise Program, taught by Mary Jane Claros, PT at 5/4/2024 12:53 PM.  Learner: Patient  Readiness: Acceptance  Method: Explanation, Demonstration  Response: Verbalizes Understanding, Demonstrated Understanding, Needs Reinforcement    Mobility Training, taught by Mary Jane Claros PT at 5/4/2024 12:53 PM.  Learner: Patient  Readiness: Acceptance  Method: Explanation, Demonstration  Response: Verbalizes Understanding, Demonstrated Understanding, Needs Reinforcement    Education Comments  No comments found.

## 2024-05-04 NOTE — H&P
"History Of Present Illness  Ryan Rasheed is a 73 y.o. male presenting with bilateral leg weakness, unable to ambulate, anxiety, shortness of breath and tachycardia, possible urinary retention, constipation.  Patient has a history of bipolar disorder, depression and anxiety and panic attacks.  He presents because he states he is too weak to walk.  Patient states he has been having problems with both legs for many months and that his feet and legs feel stiff.  He said his feet feel \"crunchy\" and his toes are stiff and painful.  All of this has been going on for at least 3 months or more.  He had an appointment with podiatry 2 days ago but he said he was too weak to get to the appointment.  Last night he said the weakness was worse and he could not get out of bed to the extent that he urinated in the bed twice.  Patient was short of breath last night but feels it was anxiety.  He does have a history of anxiety where he gets short of breath and tachycardic.  Patient states his last normal bowel movement was yesterday and that he does not typically have urinary incontinence it was just because he was too weak to get to the bathroom.  He continues to feel anxious now.  He also states he has been not eating or drinking for the last 4 days but because he was \"trying to diet.\"  Patient has a lot of urine in the bladder now seen on CT but he is refusing a Vu catheter and states he will urinate after he eats.    Past medical history: Bipolar, depression, anxiety, remote ulcerative colitis, osteoarthritis, osteoporosis, polymyalgia rheumatica, panic disorder, mood disorder, elevated alk phos, ADHD remotely, alcohol dependence in remission for several years.    Medications: Patient takes vitamin D3, hydroxyzine, lamotrigine, multivitamin, Seroquel.  He states he is not taking his Fosamax, Flexeril, gabapentin, or trazodone.    Social history: Denies tobacco or alcohol use    ED course.  Patient had an EKG showing sinus " tachycardia 108 with an inverted T wave in lead III but that is not new.  No signs of acute ischemic changes  He was given a liter of normal saline IV,  Urinalysis was ordered but is pending.  Patient has a large bladder on the CAT scan but the patient is refusing a Vu catheter.  Comprehensive metabolic panel was within normal limits other than an anion gap of 21 and an alk phos of 141, he does have a history of elevated ALK phos.  His AST was 47.  Troponin was normal at 7  CBC is within normal limits other than neutrophils of 8.76  CT of the abdomen and pelvis showed no acute abnormality but he does have moderate to large stool burden  CT angio chest was negative for PE or focal airspace disease  Chest x-ray also showed no acute process    History reviewed. No pertinent past medical history.  History reviewed. No pertinent surgical history.  Social History     Tobacco Use    Smoking status: Former     Types: Cigarettes     Passive exposure: Past    Smokeless tobacco: Never   Substance Use Topics    Alcohol use: Defer    Drug use: Never        Family History  No family history on file.     Allergies  Aspirin and Ibuprofen.  Patient states he is not really allergic to aspirin and ibuprofen but he had a remote history of ulcerative colitis so he does not take aspirin.  He does take ibuprofen occasionally    Review of Systems  Patient denies chest pain, , nausea, vomiting, fever, chills, diarrhea, constipation,  vision changes, rashes, dysuria, paresthesias, vertigo, headache, cough or cold symptoms, or any other complaints at this time. A complete review of systems was done, and is as stated in the history of present illness, is otherwise negative or not pertinent to the complaint.    Physical Exam  Physical exam: Vital signs and nurses notes were reviewed.  Vitals are completely within normal limits, he is no longer tachycardic.    General:  no acute distress. Alert and oriented  x 4.  Patient does appear slightly  anxious.    Head: atraumatic and normocephalic    Eyes: Pupils equal round reactive to light, EOMs are intact, conjunctivae is not injected.    Oropharynx: No erythema or exudate noted, no trismus or drooling, buccal mucosa is moist.    Ears:  normal external exam, no swelling or erythema,     Nasal: normal external exam,     Neck: Supple, full range of motion,     Cardiac: Regular rate and rhythm. No murmurs noted.     Pulmonary: Lungs clear bilaterally with good aeration. No adventitious breath sounds. No wheezes rales or rhonchi. No accessory muscle use no retraction noted.    Abdomen: Soft,  Nontender. No guarding, rigidity, or distention. Normoactive bowel sounds. No pulsatile masses, no bruits.     Extremities:  Full range of motion.  No soft tissue swelling or pitting edema, no pain with palpation to the lower legs, feet or toes, intact dorsalis pedal pulses.  No erythema or increased warmth, no signs of cellulitis.    Skin: No rash seen. Skin is warm and dry     Neuro: Patient is alert and oriented x4. Speech is clear. There is no asymmetry with facial grimaces, and no tongue deviation. Patient moves all extremities independently. Sensation is intact. No obvious neuro deficits are noted.     Last Recorded Vitals  /82   Pulse 92   Temp 36.2 °C (97.2 °F)   Resp 20   Wt 90.7 kg (200 lb)   SpO2 100%     Relevant Results  Scheduled medications    Continuous medications    PRN medications      Results for orders placed or performed during the hospital encounter of 05/03/24 (from the past 24 hour(s))   POCT GLUCOSE   Result Value Ref Range    POCT Glucose 138 (H) 74 - 99 mg/dL   CBC and Auto Differential   Result Value Ref Range    WBC 11.1 4.4 - 11.3 x10*3/uL    nRBC 0.0 0.0 - 0.0 /100 WBCs    RBC 5.83 4.50 - 5.90 x10*6/uL    Hemoglobin 16.9 13.5 - 17.5 g/dL    Hematocrit 48.9 41.0 - 52.0 %    MCV 84 80 - 100 fL    MCH 29.0 26.0 - 34.0 pg    MCHC 34.6 32.0 - 36.0 g/dL    RDW 13.2 11.5 - 14.5 %     Platelets 275 150 - 450 x10*3/uL    Neutrophils % 79.1 40.0 - 80.0 %    Immature Granulocytes %, Automated 0.4 0.0 - 0.9 %    Lymphocytes % 10.6 13.0 - 44.0 %    Monocytes % 8.8 2.0 - 10.0 %    Eosinophils % 0.5 0.0 - 6.0 %    Basophils % 0.6 0.0 - 2.0 %    Neutrophils Absolute 8.76 (H) 1.60 - 5.50 x10*3/uL    Immature Granulocytes Absolute, Automated 0.04 0.00 - 0.50 x10*3/uL    Lymphocytes Absolute 1.17 0.80 - 3.00 x10*3/uL    Monocytes Absolute 0.97 (H) 0.05 - 0.80 x10*3/uL    Eosinophils Absolute 0.05 0.00 - 0.40 x10*3/uL    Basophils Absolute 0.07 0.00 - 0.10 x10*3/uL   Comprehensive metabolic panel   Result Value Ref Range    Glucose 102 (H) 74 - 99 mg/dL    Sodium 139 136 - 145 mmol/L    Potassium 4.6 3.5 - 5.3 mmol/L    Chloride 100 98 - 107 mmol/L    Bicarbonate 23 21 - 32 mmol/L    Anion Gap 21 (H) 10 - 20 mmol/L    Urea Nitrogen 16 6 - 23 mg/dL    Creatinine 0.91 0.50 - 1.30 mg/dL    eGFR 90 >60 mL/min/1.73m*2    Calcium 9.6 8.6 - 10.3 mg/dL    Albumin 4.6 3.4 - 5.0 g/dL    Alkaline Phosphatase 141 (H) 33 - 136 U/L    Total Protein 7.7 6.4 - 8.2 g/dL    AST 47 (H) 9 - 39 U/L    Bilirubin, Total 0.8 0.0 - 1.2 mg/dL    ALT 18 10 - 52 U/L   Troponin I, High Sensitivity   Result Value Ref Range    Troponin I, High Sensitivity 7 0 - 20 ng/L   ECG 12 lead   Result Value Ref Range    Ventricular Rate 108 BPM    Atrial Rate 108 BPM    MN Interval 134 ms    QRS Duration 90 ms    QT Interval 340 ms    QTC Calculation(Bazett) 455 ms    P Axis 51 degrees    R Axis 37 degrees    T Axis 17 degrees    QRS Count 18 beats    Q Onset 219 ms    P Onset 152 ms    P Offset 207 ms    T Offset 389 ms    QTC Fredericia 413 ms     CT angio chest for pulmonary embolism   Final Result   1. No acute pulmonary embolism identified.   2. No focal airspace disease.        MACRO:   None        Signed by: Manohar Olguin 5/3/2024 6:17 PM   Dictation workstation:   [id number]      CT abdomen pelvis w IV contrast   Final Result   1. No  acute abnormality within the abdomen or pelvis.   2. Moderate-to-large colonic stool burden.   3. Hepatic steatosis.        MACRO:   None        Signed by: Manohar Olguin 5/3/2024 6:22 PM   Dictation workstation:   [id number]      XR chest 1 view   Final Result   No evidence of acute intrathoracic abnormality.        Signed by: Man Remy 5/3/2024 1:00 PM   Dictation workstation:   [id number]             Assessment/Plan   Principal Problem:    Weakness  Active Problems:    Unable to ambulate    Constipation    Acute urinary retention    Anxiety    Shortness of breath      Plan: PT, OT, social work consults pending  Bladder scan pending  Vu catheter ordered but patient is refusing  Urinalysis pending  Strict monitoring of intake and output  Lovenox prophylactically  Daily labs  Telemetry  Patient's vitamin D, Atarax, Lamictal, multivitamin, and Seroquel are ordered  LR at 100 cc an hour  Pantoprazole daily  Zofran as needed, Bonnie-Colace and MiraLAX for his constipation  Orders, findings, plan discussed with Dr. Marrero    Addendum: At 2250 hrs. I spoke with the RN.  The patient had 625 mL on the bladder scan.  He denied any abdominal discomfort or pain in any sense of fullness.  He refused his Lovenox and he refused a Vu catheter and straight catheter.  He is eating and drinking very well including 5 puddings, 2 ginger ale's, 2 glasses of water etc.  Earlier in the course when the patient refused the Vu I had discussed the risks with him including hydronephrosis and renal complications etc. but he declined.       Camila Huitron PA-C
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
## 2024-05-05 ENCOUNTER — APPOINTMENT (OUTPATIENT)
Dept: RADIOLOGY | Facility: HOSPITAL | Age: 74
End: 2024-05-05
Payer: MEDICARE

## 2024-05-05 LAB
ANION GAP SERPL CALC-SCNC: 13 MMOL/L (ref 10–20)
BUN SERPL-MCNC: 9 MG/DL (ref 6–23)
CALCIUM SERPL-MCNC: 8.4 MG/DL (ref 8.6–10.3)
CHLORIDE SERPL-SCNC: 104 MMOL/L (ref 98–107)
CO2 SERPL-SCNC: 25 MMOL/L (ref 21–32)
CREAT SERPL-MCNC: 0.7 MG/DL (ref 0.5–1.3)
EGFRCR SERPLBLD CKD-EPI 2021: >90 ML/MIN/1.73M*2
ERYTHROCYTE [DISTWIDTH] IN BLOOD BY AUTOMATED COUNT: 13.3 % (ref 11.5–14.5)
GLUCOSE SERPL-MCNC: 115 MG/DL (ref 74–99)
HCT VFR BLD AUTO: 38.8 % (ref 41–52)
HGB BLD-MCNC: 13.1 G/DL (ref 13.5–17.5)
HOLD SPECIMEN: NORMAL
MCH RBC QN AUTO: 28.7 PG (ref 26–34)
MCHC RBC AUTO-ENTMCNC: 33.8 G/DL (ref 32–36)
MCV RBC AUTO: 85 FL (ref 80–100)
NRBC BLD-RTO: 0 /100 WBCS (ref 0–0)
PLATELET # BLD AUTO: 192 X10*3/UL (ref 150–450)
POTASSIUM SERPL-SCNC: 3.1 MMOL/L (ref 3.5–5.3)
RBC # BLD AUTO: 4.56 X10*6/UL (ref 4.5–5.9)
SODIUM SERPL-SCNC: 139 MMOL/L (ref 136–145)
WBC # BLD AUTO: 5.7 X10*3/UL (ref 4.4–11.3)

## 2024-05-05 PROCEDURE — 2500000004 HC RX 250 GENERAL PHARMACY W/ HCPCS (ALT 636 FOR OP/ED)

## 2024-05-05 PROCEDURE — 36415 COLL VENOUS BLD VENIPUNCTURE: CPT | Performed by: PHYSICIAN ASSISTANT

## 2024-05-05 PROCEDURE — 2500000006 HC RX 250 W HCPCS SELF ADMINISTERED DRUGS (ALT 637 FOR ALL PAYERS): Performed by: INTERNAL MEDICINE

## 2024-05-05 PROCEDURE — 2500000006 HC RX 250 W HCPCS SELF ADMINISTERED DRUGS (ALT 637 FOR ALL PAYERS): Performed by: PHYSICIAN ASSISTANT

## 2024-05-05 PROCEDURE — 97535 SELF CARE MNGMENT TRAINING: CPT | Mod: GO

## 2024-05-05 PROCEDURE — 2500000001 HC RX 250 WO HCPCS SELF ADMINISTERED DRUGS (ALT 637 FOR MEDICARE OP): Performed by: PHYSICIAN ASSISTANT

## 2024-05-05 PROCEDURE — 82374 ASSAY BLOOD CARBON DIOXIDE: CPT | Performed by: PHYSICIAN ASSISTANT

## 2024-05-05 PROCEDURE — 72131 CT LUMBAR SPINE W/O DYE: CPT | Performed by: RADIOLOGY

## 2024-05-05 PROCEDURE — 72131 CT LUMBAR SPINE W/O DYE: CPT

## 2024-05-05 PROCEDURE — 85027 COMPLETE CBC AUTOMATED: CPT | Performed by: PHYSICIAN ASSISTANT

## 2024-05-05 PROCEDURE — 97165 OT EVAL LOW COMPLEX 30 MIN: CPT | Mod: GO

## 2024-05-05 PROCEDURE — G0378 HOSPITAL OBSERVATION PER HR: HCPCS

## 2024-05-05 PROCEDURE — 2500000004 HC RX 250 GENERAL PHARMACY W/ HCPCS (ALT 636 FOR OP/ED): Performed by: PHYSICIAN ASSISTANT

## 2024-05-05 RX ORDER — POTASSIUM CHLORIDE 20 MEQ/1
40 TABLET, EXTENDED RELEASE ORAL ONCE
Status: COMPLETED | OUTPATIENT
Start: 2024-05-05 | End: 2024-05-05

## 2024-05-05 RX ORDER — POTASSIUM CHLORIDE 20 MEQ/1
40 TABLET, EXTENDED RELEASE ORAL ONCE
Status: DISCONTINUED | OUTPATIENT
Start: 2024-05-05 | End: 2024-05-05

## 2024-05-05 RX ADMIN — POLYETHYLENE GLYCOL 3350 17 G: 17 POWDER, FOR SOLUTION ORAL at 08:37

## 2024-05-05 RX ADMIN — SENNOSIDES AND DOCUSATE SODIUM 2 TABLET: 8.6; 5 TABLET ORAL at 21:20

## 2024-05-05 RX ADMIN — SODIUM CHLORIDE, POTASSIUM CHLORIDE, SODIUM LACTATE AND CALCIUM CHLORIDE 100 ML/HR: 600; 310; 30; 20 INJECTION, SOLUTION INTRAVENOUS at 05:32

## 2024-05-05 RX ADMIN — PANTOPRAZOLE SODIUM 40 MG: 40 TABLET, DELAYED RELEASE ORAL at 06:24

## 2024-05-05 RX ADMIN — SODIUM CHLORIDE, POTASSIUM CHLORIDE, SODIUM LACTATE AND CALCIUM CHLORIDE 100 ML/HR: 600; 310; 30; 20 INJECTION, SOLUTION INTRAVENOUS at 17:04

## 2024-05-05 RX ADMIN — Medication 5000 UNITS: at 08:37

## 2024-05-05 RX ADMIN — LAMOTRIGINE 50 MG: 25 TABLET ORAL at 21:20

## 2024-05-05 RX ADMIN — QUETIAPINE FUMARATE 200 MG: 100 TABLET ORAL at 21:20

## 2024-05-05 RX ADMIN — POTASSIUM CHLORIDE 40 MEQ: 1500 TABLET, EXTENDED RELEASE ORAL at 06:24

## 2024-05-05 RX ADMIN — LAMOTRIGINE 50 MG: 25 TABLET ORAL at 08:37

## 2024-05-05 RX ADMIN — TAMSULOSIN HYDROCHLORIDE 0.4 MG: 0.4 CAPSULE ORAL at 08:37

## 2024-05-05 RX ADMIN — MULTIPLE VITAMINS W/ MINERALS TAB 1 TABLET: TAB at 08:37

## 2024-05-05 RX ADMIN — SENNOSIDES AND DOCUSATE SODIUM 2 TABLET: 8.6; 5 TABLET ORAL at 08:37

## 2024-05-05 ASSESSMENT — COGNITIVE AND FUNCTIONAL STATUS - GENERAL
DRESSING REGULAR LOWER BODY CLOTHING: A LITTLE
PERSONAL GROOMING: A LITTLE
HELP NEEDED FOR BATHING: A LITTLE
MOVING TO AND FROM BED TO CHAIR: A LITTLE
MOBILITY SCORE: 19
HELP NEEDED FOR BATHING: A LITTLE
PERSONAL GROOMING: A LITTLE
DRESSING REGULAR LOWER BODY CLOTHING: A LITTLE
CLIMB 3 TO 5 STEPS WITH RAILING: A LOT
DRESSING REGULAR UPPER BODY CLOTHING: A LITTLE
TOILETING: A LITTLE
STANDING UP FROM CHAIR USING ARMS: A LITTLE
MOBILITY SCORE: 19
CLIMB 3 TO 5 STEPS WITH RAILING: A LOT
DRESSING REGULAR UPPER BODY CLOTHING: A LITTLE
DAILY ACTIVITIY SCORE: 19
TOILETING: A LITTLE
STANDING UP FROM CHAIR USING ARMS: A LITTLE
MOVING TO AND FROM BED TO CHAIR: A LITTLE
DAILY ACTIVITIY SCORE: 19
WALKING IN HOSPITAL ROOM: A LITTLE
WALKING IN HOSPITAL ROOM: A LITTLE

## 2024-05-05 ASSESSMENT — ACTIVITIES OF DAILY LIVING (ADL)
HOME_MANAGEMENT_TIME_ENTRY: 25
ADL_ASSISTANCE: INDEPENDENT
BATHING_ASSISTANCE: MINIMAL

## 2024-05-05 ASSESSMENT — PAIN - FUNCTIONAL ASSESSMENT
PAIN_FUNCTIONAL_ASSESSMENT: 0-10
PAIN_FUNCTIONAL_ASSESSMENT: 0-10

## 2024-05-05 ASSESSMENT — PAIN SCALES - GENERAL
PAINLEVEL_OUTOF10: 4
PAINLEVEL_OUTOF10: 0 - NO PAIN
PAINLEVEL_OUTOF10: 0 - NO PAIN

## 2024-05-05 ASSESSMENT — VISUAL ACUITY: OU: 1

## 2024-05-05 NOTE — PROGRESS NOTES
Ryan Rasheed is a 73 y.o. male on day 0 of admission presenting with Weakness.     05/05/24 6712   Discharge Planning   Patient expects to be discharged to: SNF/ AUTH needed     Plan: Spoke with patient regarding therapy session, and patient expressed that he would like to persue SNF placement. Provided skilled nursing list to patient/family  from [facility] directory that includes facilities that are within  [facility], as well as meeting patient's medical needs, and are in-network for patient's insurance; while also in discharge geographic area patient prefers, and identifies each facilities CMS star rating. Will need follow up tomorrow morning regarding choices, and then AUTH will be needed  Disposition: SNF/AUTH needed  Barrier: SNF choices/acceptance/AUTH  ADOD:  1-2 days      Francia Cabral RN

## 2024-05-05 NOTE — PROGRESS NOTES
Occupational Therapy    Evaluation/Treatment    Patient Name: Ryan Rasheed  MRN: [id number]  : 1950  Today's Date: 24  Time Calculation  Start Time: 1227  Stop Time: 1307  Time Calculation (min): 40 min       Assessment:  OT Assessment: Pt presents with decreased balance and increased pain resulting in increased assistance required for participation in ADLs and functional mobility/transfers. Pt would benefit from OT services to address stated deficits to increase safety and independence.  Prognosis: Good  Barriers to Discharge: Decreased caregiver support  Evaluation/Treatment Tolerance: Patient tolerated treatment well  Medical Staff Made Aware: Yes  End of Session Communication: Bedside nurse  End of Session Patient Position: Bed, 3 rail up, Alarm on  OT Assessment Results: Decreased ADL status, Decreased endurance, Decreased functional mobility, Decreased IADLs  Prognosis: Good  Barriers to Discharge: Decreased caregiver support  Evaluation/Treatment Tolerance: Patient tolerated treatment well  Medical Staff Made Aware: Yes  Strengths: Capable of completing ADLs semi/independent  Plan:  Treatment Interventions: ADL retraining, Functional transfer training, Endurance training, Patient/family training, Equipment evaluation/education  OT Frequency: 3 times per week  OT Discharge Recommendations: Moderate intensity level of continued care  OT Recommended Transfer Status: Assist of 1  OT - OK to Discharge: Yes  Treatment Interventions: ADL retraining, Functional transfer training, Endurance training, Patient/family training, Equipment evaluation/education    Subjective   Current Problem:  1. Weakness        2. Urinary retention          General:   OT Received On: 24  General  Reason for Referral: 73 year old male admitted with bilateral leg weakness and unable to ambulate  Referred By: Elana HARRIS  Past Medical History Relevant to Rehab: anxiety, SOB, tachycardia, urinary retention, bipolar  disorder, depression, anxiety, panic attacks, ulcerative colitis, ADHD, alcohol dependence  Prior to Session Communication: Bedside nurse  Patient Position Received: Bed, 3 rail up, Alarm on  General Comment: Pt semi-supine upon arrival, agreeable to OT evaluation. Flat affect, delayed processing.  Precautions:  Medical Precautions: Fall precautions  Vital Signs:     Pain:  Pain Assessment  Pain Assessment: 0-10  Pain Score: 4  Pain Location:  ((B) feet)    Objective   Cognition:  Overall Cognitive Status: Within Functional Limits  Orientation Level: Oriented X4  Attention: Within Functional Limits  Problem Solving: Exceptions to WFL  Insight: Mild  Processing Speed: Delayed           Home Living:  Type of Home: House  Lives With: Alone  Home Adaptive Equipment: None  Home Layout: Two level, Bed/bath upstairs  Home Access: Stairs to enter with rails  Entrance Stairs-Rails: Left  Entrance Stairs-Number of Steps: 4  Bathroom Shower/Tub: Tub/shower unit  Bathroom Toilet: Standard  Bathroom Equipment: None  Prior Function:  Level of [place]: Independent with ADLs and functional transfers, Independent with homemaking with ambulation  Receives Help From: Other (Comment) (None available)  ADL Assistance: Independent  Homemaking Assistance: Independent  Ambulatory Assistance: Independent  IADL History:  Homemaking Responsibilities: Yes  ADL:  Eating Assistance: Independent  Grooming Assistance: Stand by  Grooming Deficit: Steadying, Supervision/safety, Standing with assistive device  Bathing Assistance: Minimal  Bathing Deficit: Left lower leg including foot, Right lower leg including foot, Use of adaptive equipment, Supervision/safety  UE Dressing Assistance: Minimal  UE Dressing Deficit: Other (Comment), Fasteners, Verbal cueing (Line mgmt)  LE Dressing Assistance: Minimal  LE Dressing Deficit: Don/doff R sock, Don/doff L sock, Don/doff R shoe, Don/doff L shoe, Setup, Steadying, Supervision/safety, Increased time to  complete, Thread LLE into pants, Thread RLE into pants  Toileting Assistance with Device: Stand by  Toileting Deficit: Steadying, Grab bar use  Functional Assistance: Stand by                  LE Dressing  LE Dressing: Yes  LE Dressing Comments: Pt doffed personal pants and underwear in unsupported standing with CGA. Pt completed doffing while seated EOB with increased time and effort. Pt threaded (B) LE through pants with Min A while seated EOB, completed donning over hips in unsupported standing with CGA. Pt donned and doffed shoes while seated EOB via figure-4 technique with CS and setup.       Activity Tolerance:  Endurance: Endurance does not limit participation in activity  Functional Standing Tolerance:     Bed Mobility/Transfers: Bed Mobility  Bed Mobility: Yes  Bed Mobility 1  Bed Mobility 1: Supine to sitting, Sitting to supine  Level of Assistance 1: Close supervision, Minimal verbal cues    Transfers  Transfer: Yes  Transfer 1  Technique 1: Sit to stand, Stand to sit  Transfer Device 1: Walker  Transfer Level of Assistance 1: Close supervision  Trials/Comments 1: Verbal cues for hand placement      Functional Mobility:  Functional Mobility  Functional Mobility Performed: Yes  Functional Mobility 1  Surface 1: Level tile  Device 1: Rolling walker  Assistance 1: Contact guard, Minimal verbal cues  Comments 1: Pt ambulated mod functional household distance using RW with CGA and verbal cues for walker positioning and safety.  Sitting Balance:  Static Sitting Balance  Static Sitting-Balance Support: Feet supported, No upper extremity supported  Static Sitting-Level of Assistance: Close supervision  Dynamic Sitting Balance  Dynamic Sitting-Balance Support: Feet supported  Dynamic Sitting-Balance: Lateral lean  Dynamic Sitting-Comments: CS during LB dressing  Standing Balance:  Static Standing Balance  Static Standing-Balance Support: Bilateral upper extremity supported  Static Standing-Level of Assistance:  Close supervision  Dynamic Standing Balance  Dynamic Standing-Balance Support: No upper extremity supported  Dynamic Standing-Balance:  (LB dressing)  Dynamic Standing-Comments: CGA    Modalities:  Modalities Used: No     Vision:Vision - Basic Assessment  Current Vision: Wears glasses only for reading  Sensation:  Light Touch: No apparent deficits  Strength:  Strength Comments: (B) UE strength WFL     Coordination:  Movements are Fluid and Coordinated: Yes   Hand Function:  Hand Function  Gross Grasp: Functional  Coordination: Functional  Extremities: RUE   RUE : Within Functional Limits and LUE   LUE: Within Functional Limits    Outcome Measures: [facility] Daily Activity  Putting on and taking off regular lower body clothing: A little  Bathing (including washing, rinsing, drying): A little  Putting on and taking off regular upper body clothing: A little  Toileting, which includes using toilet, bedpan or urinal: A little  Taking care of personal grooming such as brushing teeth: A little  Eating Meals: None  Daily Activity - Total Score: 19        Education Documentation  Precautions, taught by Rylie Hernandez OT at 5/5/2024  1:31 PM.  Learner: Patient  Readiness: Acceptance  Method: Explanation  Response: Verbalizes Understanding, Needs Reinforcement  Comment: Safety during functional transfers    ADL Training, taught by Rylie Hernandez OT at 5/5/2024  1:31 PM.  Learner: Patient  Readiness: Acceptance  Method: Explanation  Response: Verbalizes Understanding, Needs Reinforcement  Comment: Safety during functional transfers    Education Comments  No comments found.           Goals:  Encounter Problems       Encounter Problems (Active)       ADLs       Patient will perform UB and LB bathing with modified independent level of assistance. (Progressing)       Start:  05/05/24    Expected End:  05/19/24            Patient with complete upper body dressing with modified independent level of assistance donning and doffing all  UE clothes. (Progressing)       Start:  05/05/24    Expected End:  05/19/24            Patient with complete lower body dressing with modified independent level of assistance donning and doffing all LE clothes. (Progressing)       Start:  05/05/24    Expected End:  05/19/24            Patient will complete daily grooming tasks with modified independent level of assistance and PRN adaptive equipment while standing. (Progressing)       Start:  05/05/24    Expected End:  05/19/24            Patient will complete toileting including hygiene clothing management/hygiene with modified independent level of assistance. (Progressing)       Start:  05/05/24    Expected End:  05/19/24               TRANSFERS       Patient will complete functional transfer to chair with least restrictive device with modified independent level of assistance. (Progressing)       Start:  05/05/24    Expected End:  05/19/24
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
## 2024-05-05 NOTE — CARE PLAN
The patient's goals for the shift include      The clinical goals for the shift include patient will remain hemodynamically stable throughout the shift    Problem: Pain - Adult  Goal: Verbalizes/displays adequate comfort level or baseline comfort level  Outcome: Progressing     Problem: Safety - Adult  Goal: Free from fall injury  Outcome: Progressing     Problem: Discharge Planning  Goal: Discharge to home or other facility with appropriate resources  Outcome: Progressing     Problem: Chronic Conditions and Co-morbidities  Goal: Patient's chronic conditions and co-morbidity symptoms are monitored and maintained or improved  Outcome: Progressing     Problem: Skin  Goal: Decreased wound size/increased tissue granulation at next dressing change  Outcome: Progressing  Flowsheets (Taken 5/5/2024 1200)  Decreased wound size/increased tissue granulation at next dressing change: Promote sleep for wound healing  Goal: Participates in plan/prevention/treatment measures  Outcome: Progressing  Flowsheets (Taken 5/5/2024 1200)  Participates in plan/prevention/treatment measures: Increase activity/out of bed for meals  Goal: Prevent/manage excess moisture  Outcome: Progressing  Flowsheets (Taken 5/5/2024 1200)  Prevent/manage excess moisture:   Moisturize dry skin   Monitor for/manage infection if present  Goal: Prevent/minimize sheer/friction injuries  Outcome: Progressing  Flowsheets (Taken 5/5/2024 1200)  Prevent/minimize sheer/friction injuries:   HOB 30 degrees or less   Turn/reposition every 2 hours/use positioning/transfer devices  Goal: Promote/optimize nutrition  Outcome: Progressing  Flowsheets (Taken 5/5/2024 1200)  Promote/optimize nutrition:   Consume > 50% meals/supplements   Offer water/supplements/favorite foods  Goal: Promote skin healing  Outcome: Progressing  Flowsheets (Taken 5/5/2024 1200)  Promote skin healing:   Turn/reposition every 2 hours/use positioning/transfer devices   Rotate device position/do not  position patient on device     Problem: Fall/Injury  Goal: Not fall by end of shift  Outcome: Progressing  Goal: Be free from injury by end of the shift  Outcome: Progressing  Goal: Verbalize understanding of personal risk factors for fall in the hospital  Outcome: Progressing  Goal: Verbalize understanding of risk factor reduction measures to prevent injury from fall in the home  Outcome: Progressing  Goal: Use assistive devices by end of the shift  Outcome: Progressing  Goal: Pace activities to prevent fatigue by end of the shift  Outcome: Progressing     Problem: Pain  Goal: Takes deep breaths with improved pain control throughout the shift  Outcome: Progressing  Goal: Turns in bed with improved pain control throughout the shift  Outcome: Progressing  Goal: Walks with improved pain control throughout the shift  Outcome: Progressing  Goal: Performs ADL's with improved pain control throughout shift  Outcome: Progressing  Goal: Participates in PT with improved pain control throughout the shift  Outcome: Progressing  Goal: Free from opioid side effects throughout the shift  Outcome: Progressing  Goal: Free from acute confusion related to pain meds throughout the shift  Outcome: Progressing

## 2024-05-05 NOTE — DISCHARGE INSTRUCTIONS
[facility] Discharge Instructions  You came to the hospital with bilateral lower extremity weakness and were admitted for observation and further care.   PT/OT worked with you and determined the need for continued therapy at a nursing facility.      Your discharge plan is to go a skilled nursing facility for pt/ot therapies.     Please see your primary care doctor in 1 week from discharge from SNF.  An appointment referral has been requested for you but may you need to call your doctors office or use [employer] to schedule.    For any issues or concerns with appointments, call the  scheduling line at [phone] or the providers office directly.        See the attached information for education about any new medications and the condition(s) you were treated for.  Your medications may have changed so pay close attention to the list given to you at discharge and ask any questions you have before leaving the hospital.
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
## 2024-05-06 VITALS
OXYGEN SATURATION: 94 % | RESPIRATION RATE: 20 BRPM | HEART RATE: 94 BPM | DIASTOLIC BLOOD PRESSURE: 97 MMHG | TEMPERATURE: 98 F | WEIGHT: 199.96 LBS | SYSTOLIC BLOOD PRESSURE: 166 MMHG | BODY MASS INDEX: 31.38 KG/M2 | HEIGHT: 67 IN

## 2024-05-06 PROBLEM — T43.505A NEUROLEPTIC-INDUCED PARKINSONISM (MULTI): Status: ACTIVE | Noted: 2024-05-06

## 2024-05-06 PROBLEM — G21.11 NEUROLEPTIC-INDUCED PARKINSONISM (MULTI): Status: ACTIVE | Noted: 2024-05-06

## 2024-05-06 LAB
ANION GAP SERPL CALC-SCNC: 13 MMOL/L (ref 10–20)
BUN SERPL-MCNC: 5 MG/DL (ref 6–23)
CALCIUM SERPL-MCNC: 8.8 MG/DL (ref 8.6–10.3)
CHLORIDE SERPL-SCNC: 104 MMOL/L (ref 98–107)
CO2 SERPL-SCNC: 27 MMOL/L (ref 21–32)
CREAT SERPL-MCNC: 0.75 MG/DL (ref 0.5–1.3)
EGFRCR SERPLBLD CKD-EPI 2021: >90 ML/MIN/1.73M*2
ERYTHROCYTE [DISTWIDTH] IN BLOOD BY AUTOMATED COUNT: 13.1 % (ref 11.5–14.5)
GLUCOSE SERPL-MCNC: 92 MG/DL (ref 74–99)
HCT VFR BLD AUTO: 39.1 % (ref 41–52)
HGB BLD-MCNC: 13.3 G/DL (ref 13.5–17.5)
MCH RBC QN AUTO: 28.7 PG (ref 26–34)
MCHC RBC AUTO-ENTMCNC: 34 G/DL (ref 32–36)
MCV RBC AUTO: 84 FL (ref 80–100)
NRBC BLD-RTO: 0 /100 WBCS (ref 0–0)
PLATELET # BLD AUTO: 208 X10*3/UL (ref 150–450)
POTASSIUM SERPL-SCNC: 3.6 MMOL/L (ref 3.5–5.3)
RBC # BLD AUTO: 4.63 X10*6/UL (ref 4.5–5.9)
SODIUM SERPL-SCNC: 140 MMOL/L (ref 136–145)
WBC # BLD AUTO: 5.9 X10*3/UL (ref 4.4–11.3)

## 2024-05-06 PROCEDURE — 2500000006 HC RX 250 W HCPCS SELF ADMINISTERED DRUGS (ALT 637 FOR ALL PAYERS): Performed by: NURSE PRACTITIONER

## 2024-05-06 PROCEDURE — 2500000004 HC RX 250 GENERAL PHARMACY W/ HCPCS (ALT 636 FOR OP/ED): Performed by: PHYSICIAN ASSISTANT

## 2024-05-06 PROCEDURE — 99223 1ST HOSP IP/OBS HIGH 75: CPT | Performed by: PSYCHIATRY & NEUROLOGY

## 2024-05-06 PROCEDURE — 80048 BASIC METABOLIC PNL TOTAL CA: CPT | Performed by: PHYSICIAN ASSISTANT

## 2024-05-06 PROCEDURE — 96361 HYDRATE IV INFUSION ADD-ON: CPT

## 2024-05-06 PROCEDURE — 2500000001 HC RX 250 WO HCPCS SELF ADMINISTERED DRUGS (ALT 637 FOR MEDICARE OP): Performed by: PHYSICIAN ASSISTANT

## 2024-05-06 PROCEDURE — 85027 COMPLETE CBC AUTOMATED: CPT | Performed by: PHYSICIAN ASSISTANT

## 2024-05-06 PROCEDURE — G0378 HOSPITAL OBSERVATION PER HR: HCPCS

## 2024-05-06 RX ORDER — AMOXICILLIN 250 MG
2 CAPSULE ORAL 2 TIMES DAILY
Start: 2024-05-06

## 2024-05-06 RX ORDER — GABAPENTIN 100 MG/1
100 CAPSULE ORAL NIGHTLY
Status: DISCONTINUED | OUTPATIENT
Start: 2024-05-06 | End: 2024-05-06 | Stop reason: HOSPADM

## 2024-05-06 RX ORDER — POLYETHYLENE GLYCOL 3350 17 G/17G
17 POWDER, FOR SOLUTION ORAL DAILY
Start: 2024-05-06

## 2024-05-06 RX ORDER — SERTRALINE HYDROCHLORIDE 50 MG/1
100 TABLET, FILM COATED ORAL DAILY
Status: DISCONTINUED | OUTPATIENT
Start: 2024-05-06 | End: 2024-05-06 | Stop reason: HOSPADM

## 2024-05-06 RX ORDER — SERTRALINE HYDROCHLORIDE 100 MG/1
100 TABLET, FILM COATED ORAL DAILY
COMMUNITY

## 2024-05-06 RX ORDER — TAMSULOSIN HYDROCHLORIDE 0.4 MG/1
0.4 CAPSULE ORAL DAILY
Start: 2024-05-06

## 2024-05-06 RX ORDER — LAMOTRIGINE 25 MG/1
50 TABLET ORAL 2 TIMES DAILY
Start: 2024-05-06

## 2024-05-06 RX ORDER — ACETAMINOPHEN 325 MG/1
650 TABLET ORAL EVERY 4 HOURS PRN
Start: 2024-05-06

## 2024-05-06 RX ORDER — TALC
3 POWDER (GRAM) TOPICAL NIGHTLY PRN
Start: 2024-05-06

## 2024-05-06 RX ADMIN — SENNOSIDES AND DOCUSATE SODIUM 2 TABLET: 8.6; 5 TABLET ORAL at 09:15

## 2024-05-06 RX ADMIN — LAMOTRIGINE 50 MG: 25 TABLET ORAL at 09:19

## 2024-05-06 RX ADMIN — SERTRALINE HYDROCHLORIDE 100 MG: 50 TABLET ORAL at 15:00

## 2024-05-06 RX ADMIN — MULTIPLE VITAMINS W/ MINERALS TAB 1 TABLET: TAB at 09:15

## 2024-05-06 RX ADMIN — Medication 5000 UNITS: at 09:15

## 2024-05-06 RX ADMIN — SODIUM CHLORIDE, POTASSIUM CHLORIDE, SODIUM LACTATE AND CALCIUM CHLORIDE 100 ML/HR: 600; 310; 30; 20 INJECTION, SOLUTION INTRAVENOUS at 03:10

## 2024-05-06 RX ADMIN — PANTOPRAZOLE SODIUM 40 MG: 40 TABLET, DELAYED RELEASE ORAL at 06:27

## 2024-05-06 ASSESSMENT — COGNITIVE AND FUNCTIONAL STATUS - GENERAL
STANDING UP FROM CHAIR USING ARMS: A LITTLE
DRESSING REGULAR UPPER BODY CLOTHING: A LITTLE
MOVING TO AND FROM BED TO CHAIR: A LITTLE
MOBILITY SCORE: 19
TOILETING: A LITTLE
PERSONAL GROOMING: A LITTLE
DRESSING REGULAR LOWER BODY CLOTHING: A LITTLE
CLIMB 3 TO 5 STEPS WITH RAILING: A LOT
DAILY ACTIVITIY SCORE: 19
HELP NEEDED FOR BATHING: A LITTLE
WALKING IN HOSPITAL ROOM: A LITTLE

## 2024-05-06 ASSESSMENT — PAIN SCALES - PAIN ASSESSMENT IN ADVANCED DEMENTIA (PAINAD): BREATHING: NORMAL

## 2024-05-06 ASSESSMENT — PAIN SCALES - GENERAL: PAINLEVEL_OUTOF10: 0 - NO PAIN

## 2024-05-06 ASSESSMENT — PAIN SCALES - WONG BAKER: WONGBAKER_NUMERICALRESPONSE: NO HURT

## 2024-05-06 NOTE — CARE PLAN
The patient's goals for the shift include injury free    The clinical goals for the shift include remain free of falls    Over the shift, the patient did not make progress toward the following goals. Barriers to progression include weakness. Recommendations to address these barriers include increase monitoring.

## 2024-05-06 NOTE — CARE PLAN
Transport   requested  from  [facility]    SIDRA Owens, FRANCISCO            5-6-24     4038  Transport is at   6 pm. Nurse  told.     Pt is  A+Ox4,  able to  tell family on his  own if he  wishes.    SIDRA Owens, GUILLERMINAW

## 2024-05-06 NOTE — PROGRESS NOTES
Pharmacy Medication History Review    Ryan Rasheed is a 73 y.o. male admitted for Weakness. Pharmacy reviewed the patient's [id number] medications and allergies for accuracy.    Prior to Admission Medications   Prescriptions Last Dose   QUEtiapine (SEROquel) 200 mg tablet 2024   Sig: Take 1 tablet (200 mg) by mouth once daily at bedtime.   cholecalciferol (Vitamin D-3) 125 MCG (5000 UT) capsule 2024   Sig: Take 1 capsule (125 mcg) by mouth once daily.   cyclobenzaprine (Flexeril) 5 mg tablet    Si-2 tabs as needed for muscle spasm up to 3 times a day   gabapentin (Neurontin) 100 mg capsule 2024   Sig: Take 1 capsule (100 mg) by mouth once daily at bedtime.   hydrOXYzine HCL (Atarax) 25 mg tablet More than a month   Sig: Take 1 tablet (25 mg) by mouth 3 times a day as needed for anxiety.   lamoTRIgine (LaMICtal) 25 mg tablet    Sig: Take 2 tablets (50 mg) by mouth 2 times a day.   multivitamin with folic acid (One Daily Multivitamin) 400 mcg tablet 2024   Sig: Take 1 tablet by mouth once daily.   sertraline (Zoloft) 100 mg tablet    Sig: Take 1 tablet (100 mg) by mouth once daily.      Facility-Administered Medications: None       Keanu Lopez, PharmD

## 2024-05-06 NOTE — PROGRESS NOTES
05/06/24 1027   Discharge Planning   Home or Post Acute Services Post acute facilities (Rehab/SNF/etc)   Patient expects to be discharged to: SNF     Met with patient to get his choices for SNF.  He would like referrals [facility] to [facility].  Asked [facility] to send in referrals in [facility].    [facility] is able to accept.  The other two facilities do not have availability.  Confirmed [facility] is FOC with patient.  Let patient know that I will start auth with the insurance.     [id number]  Patient has auth.  Per Eva, EL, neuro to see patient and give recommendations.  If neuro clears patient will be ready for discharge.  Started a secure chat with Iliana Lisa and Marleny..
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
## 2024-05-06 NOTE — CONSULTS
"Consult to Neurology     History Of Present Illness  Ryan Rasheed 73 y.o. male admitted 5/3/2024 LOS day 0, consulted for stiffness and gait instability.     He states he has gait difficulties for several months- completed PT for back s/p minimally invasive laminectomy.  Feels toes and legs are stiff, difficulty ambulating.  Concerned because he just bought a new house with stairs.      Background of major depression and bipolar illness, takes lamictal, trazodone.  Takes seroquel 200mgnightly for insomnia.  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  Father had parkinson's disease.     Past Medical History  History reviewed. No pertinent past medical history.  Surgical History  History reviewed. No pertinent surgical history.  Social History  Social History     Tobacco Use    Smoking status: Former     Types: Cigarettes     Passive exposure: Past    Smokeless tobacco: Never   Substance Use Topics    Alcohol use: Defer    Drug use: Never     Allergies  Reviewed in EMR    Objective   Last Recorded Vitals  Blood pressure 121/68, pulse 79, temperature 35.9 °C (96.6 °F), temperature source Temporal, resp. rate 16, height 1.7 m (5' 6.93\"), weight 90.7 kg (199 lb 15.3 oz), SpO2 98%.    Physical Exam:   General appearance: no acute distress.    Neurological Exam:    Mental status: The patient was alert, interactive and cooperative with an appropriate affect.    Speech is hypophonic and flat.  Language intact for comprehension, expression, and vocabulary.   Fund of knowledge adequate for current events and past history.   Cranial Nerves- Visual fields full to confrontation. No ptosis, pupils reactive. Ocular movements full without nystagmus. Facial strength  normal but with a paucity of facial movements and expression.   Motor- No tremor or myoclonus noted.  Muscle strength is 5/5.  Tone is increased with reduced dexterity and speed.   Reflexes- DTR R/L biceps 2/2, triceps 2/2, " "patella 1/1.  Sensation intact to light touch.  Stance is stable with a negative Romberg.  Straightaway gait is bradykinetic and flexed, much more fluid with walker.  Slow to get in/ out of bed.     Reviewed relevant results, independent review of imaging:   No CT head results found for the past 14 days  No MRI head results found for the past 14 days  Encounter Date: 05/03/24   ECG 12 lead   Result Value    Ventricular Rate 108    Atrial Rate 108    NV Interval 134    QRS Duration 90    QT Interval 340    QTC Calculation(Bazett) 455    P Axis 51    R Axis 37    T Axis 17    QRS Count 18    Q Onset 219    P Onset 152    P Offset 207    T Offset 389    QTC Fredericia 413    Narrative    Sinus tachycardia  Otherwise normal ECG  When compared with ECG of 01-MAR-2023 01:11,  No significant change was found  See ED provider note for full interpretation and clinical correlation  Confirmed by Tatiana Acevedo ([id number]) on 5/3/2024 7:48:16 PM      No echocardiogram results found for the past 14 days        No results found for: \"BNP\"     Impression/ Recommendations:   Principal Problem:    Weakness  Active Problems:    Unable to ambulate    Constipation    Acute urinary retention    Anxiety    Shortness of breath    Neuroleptic-induced parkinsonism (Multi)    Parkinsonism on exam- this may be neuroleptic induced and discussed first taper/discontinue the seroquel used for insomnia. Recommend taper by 25-50mg every 3-5 days with monitoring of his mental health condition in consultation with his psychiatrist.  If neurologic symptoms resolve, then would attribute to side effect of medications.   If neurologic symptoms persist, needs consult with Movement Disorders Neurology after rehab to discuss alternative diagnosis of Parkinson's disease and recommendations for treatment at that time.      PCP: Shahid Gomez MD    Full Code       I spent >80 minutes in the professional and overall care of this patient.      Dalia Nieves, " MD

## 2024-05-14 ENCOUNTER — DOCUMENTATION (OUTPATIENT)
Dept: PRIMARY CARE | Facility: CLINIC | Age: 74
End: 2024-05-14
Payer: MEDICARE

## 2024-05-15 ENCOUNTER — PATIENT OUTREACH (OUTPATIENT)
Dept: PRIMARY CARE | Facility: CLINIC | Age: 74
End: 2024-05-15
Payer: MEDICARE
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
## 2024-05-31 ENCOUNTER — PATIENT OUTREACH (OUTPATIENT)
Dept: PRIMARY CARE | Facility: CLINIC | Age: 74
End: 2024-05-31
Payer: MEDICARE

## 2024-05-31 NOTE — PROGRESS NOTES
Call regarding follow up after hospitalization. At time of outreach call, the patient stated he is doing well. The patient missed his hospital follow up with Dr. Gomez and declines to reschedule at this time.

## 2024-07-30 ENCOUNTER — APPOINTMENT (OUTPATIENT)
Dept: RHEUMATOLOGY | Facility: CLINIC | Age: 74
End: 2024-07-30
Payer: MEDICARE
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
## 2024-10-24 ENCOUNTER — APPOINTMENT (OUTPATIENT)
Dept: RHEUMATOLOGY | Facility: CLINIC | Age: 74
End: 2024-10-24
Payer: MEDICARE

## 2024-12-26 ENCOUNTER — APPOINTMENT (OUTPATIENT)
Dept: PRIMARY CARE | Facility: CLINIC | Age: 74
End: 2024-12-26
Payer: MEDICARE

## 2025-01-08 ENCOUNTER — HOSPITAL ENCOUNTER (EMERGENCY)
Facility: HOSPITAL | Age: 75
Discharge: PSYCHIATRIC OR SUBSTANCE USE FACILITY | End: 2025-01-09
Attending: STUDENT IN AN ORGANIZED HEALTH CARE EDUCATION/TRAINING PROGRAM
Payer: MEDICARE

## 2025-01-08 ENCOUNTER — APPOINTMENT (OUTPATIENT)
Dept: RADIOLOGY | Facility: HOSPITAL | Age: 75
End: 2025-01-08
Payer: MEDICARE

## 2025-01-08 ENCOUNTER — APPOINTMENT (OUTPATIENT)
Dept: CARDIOLOGY | Facility: HOSPITAL | Age: 75
End: 2025-01-08
Payer: MEDICARE

## 2025-01-08 DIAGNOSIS — R45.851 PASSIVE SUICIDAL IDEATIONS: ICD-10-CM

## 2025-01-08 DIAGNOSIS — F41.9 ANXIETY: ICD-10-CM

## 2025-01-08 DIAGNOSIS — Z59.819 HOUSING INSECURITY: Primary | ICD-10-CM

## 2025-01-08 LAB
ALBUMIN SERPL BCP-MCNC: 5.1 G/DL (ref 3.4–5)
ALP SERPL-CCNC: 110 U/L (ref 33–136)
ALT SERPL W P-5'-P-CCNC: 37 U/L (ref 10–52)
AMPHETAMINES UR QL SCN: NORMAL
ANION GAP SERPL CALC-SCNC: 19 MMOL/L (ref 10–20)
APAP SERPL-MCNC: <10 UG/ML
APPEARANCE UR: CLEAR
AST SERPL W P-5'-P-CCNC: 27 U/L (ref 9–39)
BARBITURATES UR QL SCN: NORMAL
BASOPHILS # BLD AUTO: 0.05 X10*3/UL (ref 0–0.1)
BASOPHILS NFR BLD AUTO: 0.6 %
BENZODIAZ UR QL SCN: NORMAL
BILIRUB SERPL-MCNC: 1 MG/DL (ref 0–1.2)
BILIRUB UR STRIP.AUTO-MCNC: ABNORMAL MG/DL
BUN SERPL-MCNC: 12 MG/DL (ref 6–23)
BZE UR QL SCN: NORMAL
CALCIUM SERPL-MCNC: 10 MG/DL (ref 8.6–10.3)
CANNABINOIDS UR QL SCN: NORMAL
CHLORIDE SERPL-SCNC: 98 MMOL/L (ref 98–107)
CO2 SERPL-SCNC: 26 MMOL/L (ref 21–32)
COLOR UR: YELLOW
CREAT SERPL-MCNC: 1.01 MG/DL (ref 0.5–1.3)
EGFRCR SERPLBLD CKD-EPI 2021: 79 ML/MIN/1.73M*2
EOSINOPHIL # BLD AUTO: 0.01 X10*3/UL (ref 0–0.4)
EOSINOPHIL NFR BLD AUTO: 0.1 %
ERYTHROCYTE [DISTWIDTH] IN BLOOD BY AUTOMATED COUNT: 14.2 % (ref 11.5–14.5)
ETHANOL SERPL-MCNC: <10 MG/DL
FENTANYL+NORFENTANYL UR QL SCN: NORMAL
GLUCOSE SERPL-MCNC: 138 MG/DL (ref 74–99)
GLUCOSE UR STRIP.AUTO-MCNC: NORMAL MG/DL
HCT VFR BLD AUTO: 49.6 % (ref 41–52)
HGB BLD-MCNC: 17 G/DL (ref 13.5–17.5)
HOLD SPECIMEN: NORMAL
HYALINE CASTS #/AREA URNS AUTO: ABNORMAL /LPF
IMM GRANULOCYTES # BLD AUTO: 0.04 X10*3/UL (ref 0–0.5)
IMM GRANULOCYTES NFR BLD AUTO: 0.4 % (ref 0–0.9)
KETONES UR STRIP.AUTO-MCNC: ABNORMAL MG/DL
LEUKOCYTE ESTERASE UR QL STRIP.AUTO: NEGATIVE
LYMPHOCYTES # BLD AUTO: 1.12 X10*3/UL (ref 0.8–3)
LYMPHOCYTES NFR BLD AUTO: 12.5 %
MAGNESIUM SERPL-MCNC: 2.01 MG/DL (ref 1.6–2.4)
MCH RBC QN AUTO: 28.8 PG (ref 26–34)
MCHC RBC AUTO-ENTMCNC: 34.3 G/DL (ref 32–36)
MCV RBC AUTO: 84 FL (ref 80–100)
METHADONE UR QL SCN: NORMAL
MONOCYTES # BLD AUTO: 0.64 X10*3/UL (ref 0.05–0.8)
MONOCYTES NFR BLD AUTO: 7.1 %
MUCOUS THREADS #/AREA URNS AUTO: ABNORMAL /LPF
NEUTROPHILS # BLD AUTO: 7.12 X10*3/UL (ref 1.6–5.5)
NEUTROPHILS NFR BLD AUTO: 79.3 %
NITRITE UR QL STRIP.AUTO: NEGATIVE
NRBC BLD-RTO: 0 /100 WBCS (ref 0–0)
OPIATES UR QL SCN: NORMAL
OXYCODONE+OXYMORPHONE UR QL SCN: NORMAL
PCP UR QL SCN: NORMAL
PH UR STRIP.AUTO: 5.5 [PH]
PHOSPHATE SERPL-MCNC: 4 MG/DL (ref 2.5–4.9)
PLATELET # BLD AUTO: 251 X10*3/UL (ref 150–450)
POTASSIUM SERPL-SCNC: 3.7 MMOL/L (ref 3.5–5.3)
PROT SERPL-MCNC: 8.5 G/DL (ref 6.4–8.2)
PROT UR STRIP.AUTO-MCNC: ABNORMAL MG/DL
RBC # BLD AUTO: 5.91 X10*6/UL (ref 4.5–5.9)
RBC # UR STRIP.AUTO: NEGATIVE /UL
RBC #/AREA URNS AUTO: ABNORMAL /HPF
SALICYLATES SERPL-MCNC: <3 MG/DL
SODIUM SERPL-SCNC: 139 MMOL/L (ref 136–145)
SP GR UR STRIP.AUTO: 1.03
TSH SERPL-ACNC: 2.27 MIU/L (ref 0.44–3.98)
UROBILINOGEN UR STRIP.AUTO-MCNC: ABNORMAL MG/DL
WBC # BLD AUTO: 9 X10*3/UL (ref 4.4–11.3)
WBC #/AREA URNS AUTO: ABNORMAL /HPF

## 2025-01-08 PROCEDURE — 80179 DRUG ASSAY SALICYLATE: CPT

## 2025-01-08 PROCEDURE — 80307 DRUG TEST PRSMV CHEM ANLYZR: CPT

## 2025-01-08 PROCEDURE — 2500000001 HC RX 250 WO HCPCS SELF ADMINISTERED DRUGS (ALT 637 FOR MEDICARE OP): Performed by: STUDENT IN AN ORGANIZED HEALTH CARE EDUCATION/TRAINING PROGRAM

## 2025-01-08 PROCEDURE — 2500000002 HC RX 250 W HCPCS SELF ADMINISTERED DRUGS (ALT 637 FOR MEDICARE OP, ALT 636 FOR OP/ED)

## 2025-01-08 PROCEDURE — 71045 X-RAY EXAM CHEST 1 VIEW: CPT

## 2025-01-08 PROCEDURE — 83735 ASSAY OF MAGNESIUM: CPT | Performed by: STUDENT IN AN ORGANIZED HEALTH CARE EDUCATION/TRAINING PROGRAM

## 2025-01-08 PROCEDURE — 96361 HYDRATE IV INFUSION ADD-ON: CPT

## 2025-01-08 PROCEDURE — 99285 EMERGENCY DEPT VISIT HI MDM: CPT | Mod: 25 | Performed by: STUDENT IN AN ORGANIZED HEALTH CARE EDUCATION/TRAINING PROGRAM

## 2025-01-08 PROCEDURE — 99285 EMERGENCY DEPT VISIT HI MDM: CPT | Performed by: STUDENT IN AN ORGANIZED HEALTH CARE EDUCATION/TRAINING PROGRAM

## 2025-01-08 PROCEDURE — 84443 ASSAY THYROID STIM HORMONE: CPT | Performed by: STUDENT IN AN ORGANIZED HEALTH CARE EDUCATION/TRAINING PROGRAM

## 2025-01-08 PROCEDURE — 84100 ASSAY OF PHOSPHORUS: CPT | Performed by: STUDENT IN AN ORGANIZED HEALTH CARE EDUCATION/TRAINING PROGRAM

## 2025-01-08 PROCEDURE — 80053 COMPREHEN METABOLIC PANEL: CPT

## 2025-01-08 PROCEDURE — 96360 HYDRATION IV INFUSION INIT: CPT

## 2025-01-08 PROCEDURE — 2500000004 HC RX 250 GENERAL PHARMACY W/ HCPCS (ALT 636 FOR OP/ED): Performed by: STUDENT IN AN ORGANIZED HEALTH CARE EDUCATION/TRAINING PROGRAM

## 2025-01-08 PROCEDURE — 81001 URINALYSIS AUTO W/SCOPE: CPT | Performed by: STUDENT IN AN ORGANIZED HEALTH CARE EDUCATION/TRAINING PROGRAM

## 2025-01-08 PROCEDURE — 70450 CT HEAD/BRAIN W/O DYE: CPT

## 2025-01-08 PROCEDURE — 80320 DRUG SCREEN QUANTALCOHOLS: CPT

## 2025-01-08 PROCEDURE — 70450 CT HEAD/BRAIN W/O DYE: CPT | Performed by: RADIOLOGY

## 2025-01-08 PROCEDURE — 85025 COMPLETE CBC W/AUTO DIFF WBC: CPT

## 2025-01-08 PROCEDURE — 36415 COLL VENOUS BLD VENIPUNCTURE: CPT

## 2025-01-08 PROCEDURE — 71045 X-RAY EXAM CHEST 1 VIEW: CPT | Performed by: RADIOLOGY

## 2025-01-08 PROCEDURE — 93005 ELECTROCARDIOGRAM TRACING: CPT

## 2025-01-08 PROCEDURE — 2500000001 HC RX 250 WO HCPCS SELF ADMINISTERED DRUGS (ALT 637 FOR MEDICARE OP)

## 2025-01-08 RX ORDER — TRAZODONE HYDROCHLORIDE 100 MG/1
100 TABLET ORAL NIGHTLY
Status: DISCONTINUED | OUTPATIENT
Start: 2025-01-08 | End: 2025-01-09 | Stop reason: HOSPADM

## 2025-01-08 RX ORDER — SERTRALINE HYDROCHLORIDE 50 MG/1
150 TABLET, FILM COATED ORAL ONCE
Status: COMPLETED | OUTPATIENT
Start: 2025-01-08 | End: 2025-01-08

## 2025-01-08 RX ORDER — ALPRAZOLAM 0.5 MG/1
0.5 TABLET ORAL ONCE
Status: COMPLETED | OUTPATIENT
Start: 2025-01-08 | End: 2025-01-08

## 2025-01-08 RX ORDER — ALPRAZOLAM 0.25 MG/1
0.25 TABLET ORAL ONCE
Status: DISCONTINUED | OUTPATIENT
Start: 2025-01-08 | End: 2025-01-08

## 2025-01-08 RX ORDER — TRAZODONE HYDROCHLORIDE 100 MG/1
100-200 TABLET ORAL NIGHTLY PRN
COMMUNITY

## 2025-01-08 RX ORDER — SERTRALINE HYDROCHLORIDE 50 MG/1
50 TABLET, FILM COATED ORAL DAILY
COMMUNITY

## 2025-01-08 RX ORDER — LAMOTRIGINE 25 MG/1
50 TABLET ORAL ONCE
Status: COMPLETED | OUTPATIENT
Start: 2025-01-08 | End: 2025-01-08

## 2025-01-08 RX ORDER — ALPRAZOLAM 0.25 MG/1
0.25 TABLET ORAL ONCE
Status: COMPLETED | OUTPATIENT
Start: 2025-01-08 | End: 2025-01-08

## 2025-01-08 RX ORDER — OLANZAPINE 10 MG/2ML
5 INJECTION, POWDER, FOR SOLUTION INTRAMUSCULAR ONCE
Status: DISCONTINUED | OUTPATIENT
Start: 2025-01-08 | End: 2025-01-09 | Stop reason: HOSPADM

## 2025-01-08 RX ADMIN — SERTRALINE HYDROCHLORIDE 150 MG: 50 TABLET ORAL at 19:15

## 2025-01-08 RX ADMIN — ALPRAZOLAM 0.5 MG: 0.5 TABLET ORAL at 18:50

## 2025-01-08 RX ADMIN — TRAZODONE HYDROCHLORIDE 100 MG: 100 TABLET ORAL at 21:41

## 2025-01-08 RX ADMIN — ALPRAZOLAM 0.25 MG: 0.25 TABLET ORAL at 13:21

## 2025-01-08 RX ADMIN — LAMOTRIGINE 50 MG: 25 TABLET ORAL at 19:16

## 2025-01-08 RX ADMIN — SODIUM CHLORIDE 1000 ML: 9 INJECTION, SOLUTION INTRAVENOUS at 13:39

## 2025-01-08 SDOH — HEALTH STABILITY: MENTAL HEALTH: ANXIETY SYMPTOMS: GENERALIZED;PANIC ATTACK;FEELINGS OF DOOM;OBSESSIONS

## 2025-01-08 SDOH — SOCIAL STABILITY: SOCIAL NETWORK

## 2025-01-08 SDOH — HEALTH STABILITY: MENTAL HEALTH
OTHER SUICIDE PRECAUTIONS INCLUDE: PATIENT PLACED IN AN EASILY OBSERVABLE ROOM WITH DOOR/CURTAIN REMAINING OPEN;PATIENT PLACED IN GOWN (SNAPS OR PAPER GOWNS PREFERRED) AND WANDED;PATIENT PLACED IN PSYCH SAFE ROOM (IF AVAILABLE);PROVIDER NOTIFIED;PERSONAL BELONGINGS SECURED

## 2025-01-08 SDOH — HEALTH STABILITY: MENTAL HEALTH: BEHAVIORS/MOOD: ANXIOUS

## 2025-01-08 SDOH — ECONOMIC STABILITY: HOUSING INSECURITY: FEELS SAFE LIVING IN HOME: YES

## 2025-01-08 SDOH — HEALTH STABILITY: MENTAL HEALTH: WISH TO BE DEAD (PAST 1 MONTH): NO

## 2025-01-08 SDOH — HEALTH STABILITY: MENTAL HEALTH: BEHAVIORAL HEALTH(WDL): EXCEPTIONS TO WDL

## 2025-01-08 SDOH — HEALTH STABILITY: MENTAL HEALTH: DEPRESSION SYMPTOMS: APPETITE CHANGE;CHANGE IN ENERGY LEVEL;FEELINGS OF HELPLESSNESS;INCREASED IRRITABILITY

## 2025-01-08 SDOH — ECONOMIC STABILITY: GENERAL: FINANCIAL CONCERNS: UNABLE TO PAY BILLS

## 2025-01-08 SDOH — SOCIAL STABILITY: SOCIAL NETWORK: EMOTIONAL SUPPORT GIVEN: REASSURE

## 2025-01-08 SDOH — HEALTH STABILITY: MENTAL HEALTH: NEEDS EXPRESSED: EMOTIONAL

## 2025-01-08 SDOH — HEALTH STABILITY: MENTAL HEALTH: HAVE YOU ACTUALLY HAD ANY THOUGHTS OF KILLING YOURSELF?: NO

## 2025-01-08 SDOH — HEALTH STABILITY: MENTAL HEALTH: HAVE YOU EVER DONE ANYTHING, STARTED TO DO ANYTHING, OR PREPARED TO DO ANYTHING TO END YOUR LIFE?: NO

## 2025-01-08 SDOH — HEALTH STABILITY: MENTAL HEALTH: HALLUCINATION: OTHER (COMMENT)

## 2025-01-08 SDOH — ECONOMIC STABILITY - HOUSING INSECURITY: HOUSING INSTABILITY UNSPECIFIED: Z59.819

## 2025-01-08 SDOH — HEALTH STABILITY: MENTAL HEALTH: IN THE PAST FEW WEEKS, HAVE YOU WISHED YOU WERE DEAD?: NO

## 2025-01-08 SDOH — HEALTH STABILITY: MENTAL HEALTH: IN THE PAST WEEK, HAVE YOU BEEN HAVING THOUGHTS ABOUT KILLING YOURSELF?: NO

## 2025-01-08 SDOH — HEALTH STABILITY: MENTAL HEALTH: DELUSIONS: OTHER (COMMENT)

## 2025-01-08 SDOH — HEALTH STABILITY: MENTAL HEALTH: HAVE YOU WISHED YOU WERE DEAD OR WISHED YOU COULD GO TO SLEEP AND NOT WAKE UP?: NO

## 2025-01-08 SDOH — HEALTH STABILITY: MENTAL HEALTH: IN THE PAST FEW WEEKS, HAVE YOU FELT THAT YOU OR YOUR FAMILY WOULD BE BETTER OFF IF YOU WERE DEAD?: NO

## 2025-01-08 SDOH — SOCIAL STABILITY: SOCIAL INSECURITY: FAMILY BEHAVIORS: ANXIOUS

## 2025-01-08 SDOH — HEALTH STABILITY: MENTAL HEALTH: ARE YOU HAVING THOUGHTS OF KILLING YOURSELF RIGHT NOW?: NO

## 2025-01-08 SDOH — HEALTH STABILITY: MENTAL HEALTH: HAVE YOU EVER TRIED TO KILL YOURSELF?: NO

## 2025-01-08 SDOH — HEALTH STABILITY: MENTAL HEALTH: NON-SPECIFIC ACTIVE SUICIDAL THOUGHTS (PAST 1 MONTH): NO

## 2025-01-08 SDOH — HEALTH STABILITY: MENTAL HEALTH: SUICIDAL BEHAVIOR (LIFETIME): NO

## 2025-01-08 SDOH — HEALTH STABILITY: MENTAL HEALTH: BEHAVIORS/MOOD: CALM;COOPERATIVE

## 2025-01-08 ASSESSMENT — LIFESTYLE VARIABLES
SUBSTANCE_ABUSE_PAST_12_MONTHS: NO
HAVE YOU EVER FELT YOU SHOULD CUT DOWN ON YOUR DRINKING: NO
EVER HAD A DRINK FIRST THING IN THE MORNING TO STEADY YOUR NERVES TO GET RID OF A HANGOVER: NO
TOTAL SCORE: 0
EVER FELT BAD OR GUILTY ABOUT YOUR DRINKING: NO
PRESCIPTION_ABUSE_PAST_12_MONTHS: NO
HAVE PEOPLE ANNOYED YOU BY CRITICIZING YOUR DRINKING: NO

## 2025-01-08 ASSESSMENT — COLUMBIA-SUICIDE SEVERITY RATING SCALE - C-SSRS
2. HAVE YOU ACTUALLY HAD ANY THOUGHTS OF KILLING YOURSELF?: NO
6. HAVE YOU EVER DONE ANYTHING, STARTED TO DO ANYTHING, OR PREPARED TO DO ANYTHING TO END YOUR LIFE?: NO
1. SINCE LAST CONTACT, HAVE YOU WISHED YOU WERE DEAD OR WISHED YOU COULD GO TO SLEEP AND NOT WAKE UP?: NO
2. HAVE YOU ACTUALLY HAD ANY THOUGHTS OF KILLING YOURSELF?: NO
1. IN THE PAST MONTH, HAVE YOU WISHED YOU WERE DEAD OR WISHED YOU COULD GO TO SLEEP AND NOT WAKE UP?: NO
6. HAVE YOU EVER DONE ANYTHING, STARTED TO DO ANYTHING, OR PREPARED TO DO ANYTHING TO END YOUR LIFE?: NO

## 2025-01-08 ASSESSMENT — PAIN - FUNCTIONAL ASSESSMENT: PAIN_FUNCTIONAL_ASSESSMENT: 0-10

## 2025-01-08 NOTE — ED PROVIDER NOTES
EMERGENCY DEPARTMENT ENCOUNTER      Pt Name: Ryan Rasheed  MRN: [id number]  Birthdate 1950  Date of evaluation: 1/8/2025  Provider: Dean Irvin DO    CHIEF COMPLAINT       Chief Complaint   Patient presents with    Psychiatric Evaluation         HISTORY OF PRESENT ILLNESS    HPI    74-year-old male with past medical history significant for bipolar disorder, anxiety, panic disorder with agoraphobia presenting to the emergency department from an outpatient psychiatric facility. Patient was pink slipped at that facility for passive SI without a plan and was sent to the emergency department for EPAT evaluation. Patient adamantly denies any thoughts of wanting to hurt himself or anyone else and also denies auditory or visual hallucinations. Patient states he is here because he wants to speak to a  as he is concerned he is going to lose his house. Attending physician called both patient's son and also the attending physician at patient's outpatient psychiatric facility who sent him here who confirmed both pink slip for passive SI and also the son states that patient has not been doing well at home with self-care, hydration and nutrition and he has had prior flareups of bipolar and anxiety that presented similarly.    Nursing Notes were reviewed.    PAST MEDICAL HISTORY   No past medical history on file.      SURGICAL HISTORY     No past surgical history on file.      CURRENT MEDICATIONS       Previous Medications    ACETAMINOPHEN (TYLENOL) 325 MG TABLET    Take 2 tablets (650 mg) by mouth every 4 hours if needed for mild pain (1 - 3) or fever (temp greater than 38.0 C).    CHOLECALCIFEROL (VITAMIN D-3) 125 MCG (5000 UT) CAPSULE    Take 1 capsule (125 mcg) by mouth once daily.    GABAPENTIN (NEURONTIN) 100 MG CAPSULE    Take 1 capsule (100 mg) by mouth once daily at bedtime.    HYDROXYZINE HCL (ATARAX) 25 MG TABLET    Take 1 tablet (25 mg) by mouth 3 times a day as needed for anxiety.     LAMOTRIGINE (LAMICTAL) 25 MG TABLET    Take 2 tablets (50 mg) by mouth 2 times a day.    LAMOTRIGINE (LAMICTAL) 25 MG TABLET    Take 2 tablets (50 mg) by mouth 2 times a day.    MELATONIN 3 MG TABLET    Take 1 tablet (3 mg) by mouth as needed at bedtime for sleep.    MULTIVITAMIN WITH FOLIC ACID (ONE DAILY MULTIVITAMIN) 400 MCG TABLET    Take 1 tablet by mouth once daily.    POLYETHYLENE GLYCOL (GLYCOLAX, MIRALAX) 17 GRAM PACKET    Take 17 g by mouth once daily.    SENNOSIDES-DOCUSATE SODIUM (VELMA-COLACE) 8.6-50 MG TABLET    Take 2 tablets by mouth 2 times a day.    SERTRALINE (ZOLOFT) 100 MG TABLET    Take 1 tablet (100 mg) by mouth once daily.    TAMSULOSIN (FLOMAX) 0.4 MG 24 HR CAPSULE    Take 1 capsule (0.4 mg) by mouth once daily.       ALLERGIES     Aspirin and Ibuprofen    FAMILY HISTORY     No family history on file.       SOCIAL HISTORY       Social History     Socioeconomic History    Marital status:    Tobacco Use    Smoking status: Former     Types: Cigarettes     Passive exposure: Past    Smokeless tobacco: Never   Substance and Sexual Activity    Alcohol use: Defer    Drug use: Never     Social Drivers of Health     Financial Resource Strain: Low Risk  (5/3/2024)    Overall Financial Resource Strain (CARDIA)     Difficulty of Paying Living Expenses: Not hard at all   Food Insecurity: No Food Insecurity (1/22/2024)    Received from [facility]    Hunger Vital Sign     Worried About Running Out of Food in the Last Year: Never true     Ran Out of Food in the Last Year: Never true   Transportation Needs: No Transportation Needs (5/3/2024)    PRAPARE - Transportation     Lack of Transportation (Medical): No     Lack of Transportation (Non-Medical): No   Physical Activity: Insufficiently Active (1/22/2024)    Received from [facility]    Exercise Vital Sign     Days of Exercise per Week: 1 day     Minutes of Exercise per Session: 20 min   Stress: Stress  Concern Present (1/22/2024)    Received from [facility], [facility] - Occupational Stress Questionnaire     Feeling of Stress : To some extent   Social Connections: Moderately Isolated (1/22/2024)    Received from [facility], [facility]    Social Connection and Isolation Panel [NHANES]     Frequency of Communication with Friends and Family: Once a week     Frequency of Social Gatherings with Friends and Family: Once a week     Attends [employer] Services: 1 to 4 times per year     Active Member of Clubs or Organizations: Yes     Attends Club or Organization Meetings: 1 to 4 times per year     Marital Status:    Housing Stability: Low Risk  (5/3/2024)    Housing Stability Vital Sign     Unable to Pay for Housing in the Last Year: No     Number of Places Lived in the Last Year: 1     Unstable Housing in the Last Year: No       SCREENINGS                        PHYSICAL EXAM    (up to 7 for level 4, 8 or more for level 5)     ED Triage Vitals [01/08/25 1230]   Temperature Heart Rate Respirations BP   36.2 °C (97.2 °F) (!) 118 16 141/87      Pulse Ox Temp Source Heart Rate Source Patient Position   95 % Temporal Monitor Sitting      BP Location FiO2 (%)     Right arm --       Physical Exam  Vitals and nursing note reviewed.   Constitutional:       Comments: Patient appears anxious, unkempt with poor hygiene but does not appear to be in acute distress.   HENT:      Head: Normocephalic and atraumatic.      Right Ear: External ear normal.      Left Ear: External ear normal.      Nose: Nose normal.      Mouth/Throat:      Pharynx: Oropharynx is clear.   Eyes:      Conjunctiva/sclera: Conjunctivae normal.   Cardiovascular:      Rate and Rhythm: Regular rhythm. Tachycardia present.      Pulses: Normal pulses.      Heart sounds: Normal heart sounds.   Pulmonary:      Effort: Pulmonary effort is normal.      Breath sounds: Normal breath sounds.    Abdominal:      General: Abdomen is flat.      Palpations: Abdomen is soft.   Musculoskeletal:         General: Normal range of motion.      Cervical back: Normal range of motion and neck supple.   Skin:     General: Skin is warm and dry.   Neurological:      General: No focal deficit present.      Mental Status: He is alert and oriented to person, place, and time.   Psychiatric:         Attention and Perception: Attention normal. He does not perceive auditory or visual hallucinations.         Mood and Affect: Mood is anxious. Mood is not depressed or elated. Affect is not labile, blunt, flat, angry, tearful or inappropriate.         Speech: Speech is not rapid and pressured, delayed, slurred or tangential.         Behavior: Behavior normal. Behavior is not agitated, slowed, aggressive, withdrawn, hyperactive or combative.         Thought Content: Thought content is not paranoid or delusional. Thought content does not include homicidal or suicidal ideation. Thought content does not include homicidal or suicidal plan.         Judgment: Judgment is not impulsive or inappropriate.      Comments: Patient appears to be anxious, tremulous and states he is concerned about losing his house.  Patient has difficulty sitting down still in bed and often stands up and walks around the room but does not appear to be externally stimulated without audio of his hallucinations and does not appear to be manic or psychotic.  Denies SI or HI.          DIAGNOSTIC RESULTS     LABS:  Labs Reviewed   CBC WITH AUTO DIFFERENTIAL - Abnormal       Result Value    WBC 9.0      nRBC 0.0      RBC 5.91 (*)     Hemoglobin 17.0      Hematocrit 49.6      MCV 84      MCH 28.8      MCHC 34.3      RDW 14.2      Platelets 251      Neutrophils % 79.3      Immature Granulocytes %, Automated 0.4      Lymphocytes % 12.5      Monocytes % 7.1      Eosinophils % 0.1      Basophils % 0.6      Neutrophils Absolute 7.12 (*)     Immature Granulocytes Absolute,  Automated 0.04      Lymphocytes Absolute 1.12      Monocytes Absolute 0.64      Eosinophils Absolute 0.01      Basophils Absolute 0.05     COMPREHENSIVE METABOLIC PANEL - Abnormal    Glucose 138 (*)     Sodium 139      Potassium 3.7      Chloride 98      Bicarbonate 26      Anion Gap 19      Urea Nitrogen 12      Creatinine 1.01      eGFR 79      Calcium 10.0      Albumin 5.1 (*)     Alkaline Phosphatase 110      Total Protein 8.5 (*)     AST 27      Bilirubin, Total 1.0      ALT 37     URINALYSIS WITH REFLEX CULTURE AND MICROSCOPIC - Abnormal    Color, Urine Yellow      Appearance, Urine Clear      Specific Gravity, Urine 1.029      pH, Urine 5.5      Protein, Urine 50 (1+) (*)     Glucose, Urine Normal      Blood, Urine NEGATIVE      Ketones, Urine 40 (2+) (*)     Bilirubin, Urine 0.5 (1+) (*)     Urobilinogen, Urine 4 (2+) (*)     Nitrite, Urine NEGATIVE      Leukocyte Esterase, Urine NEGATIVE     URINALYSIS MICROSCOPIC WITH REFLEX CULTURE - Abnormal    WBC, Urine 6-10 (*)     RBC, Urine 3-5      Mucus, Urine 4+      Hyaline Casts, Urine 3+ (*)    DRUG SCREEN,URINE - Normal    Amphetamine Screen, Urine Presumptive Negative      Barbiturate Screen, Urine Presumptive Negative      Benzodiazepines Screen, Urine Presumptive Negative      Cannabinoid Screen, Urine Presumptive Negative      Cocaine Metabolite Screen, Urine Presumptive Negative      Fentanyl Screen, Urine Presumptive Negative      Opiate Screen, Urine Presumptive Negative      Oxycodone Screen, Urine Presumptive Negative      PCP Screen, Urine Presumptive Negative      Methadone Screen, Urine Presumptive Negative      Narrative:     Drug screen results are presumptive and should not be used to assess   compliance with prescribed medication. Contact the performing [facility] laboratory   to add-on definitive confirmatory testing if clinically indicated.    Toxicology screening results are reported qualitatively. The concentration must   be greater than or  equal to the cutoff to be reported as positive. The concentration   at which the screening test can detect an individual drug or metabolite varies.   The absence of expected drug(s) and/or drug metabolite(s) may indicate non-compliance,   inappropriate timing of specimen collection relative to drug administration, poor drug   absorption, diluted/adulterated urine, or limitations of testing. For medical purposes   only; not valid for forensic use.    Interpretive questions should be directed to the laboratory medical directors.   ACUTE TOXICOLOGY PANEL, BLOOD - Normal    Acetaminophen <10.0      Salicylate  <3      Alcohol <10     TSH WITH REFLEX TO FREE T4 IF ABNORMAL - Normal    Thyroid Stimulating Hormone 2.27      Narrative:     TSH testing is performed using different testing methodology at [facility] than at other [facility]. Direct result comparisons should only be made within the same method.     MAGNESIUM - Normal    Magnesium 2.01     PHOSPHORUS - Normal    Phosphorus 4.0     URINALYSIS WITH REFLEX CULTURE AND MICROSCOPIC    Narrative:     The following orders were created for panel order Urinalysis with Reflex Culture and Microscopic.  Procedure                               Abnormality         Status                     ---------                               -----------         ------                     Urinalysis with Reflex C...[[id number]]  Abnormal            Final result               Extra Urine Gray Tube[[id number]]                            In process                   Please view results for these tests on the individual orders.   EXTRA URINE GRAY TUBE       All other labs were within normal range or not returned as of this dictation.    Imaging  CT head wo IV contrast   Final Result   No acute intracranial pathology.             Signed by: Hans Villasenor 1/8/2025 2:23 PM   Dictation workstation:   [id number]      XR chest 1 view   Final Result   No acute cardiopulmonary  "disease.        Signed by: Jessica Maier 1/8/2025 2:14 PM   Dictation workstation:   [id number]           Procedures  Procedures     EMERGENCY DEPARTMENT COURSE/MDM:     ED Course as of 01/08/25 2115 Wed Jan 08, 2025   1259 Patient sent in from facility in [place] with reports from EMS that patient has been pink slipped by a doctor there for passive SI without plan.  Patient to me is indicating that he is just here to see a  about his homelessness, denies mental health problems [JH]   1300 I called [facility] at [phone] for collateral, Dr. Malone (Dr. CASTILLO) [JH]   1311 I spoke to Tyshawn, son, on the phone, who lives in [place], he indicates that patient has not been doing well, self care, hygiene, or nutrition. This is consistent with other flare-ups of his bipolar/anxiety. Patient does have a lot of anxiety from worrying about being homeless. Indicates that he has \"flared up\" like this to this level in 2012, very similar in which worried about homelessness (his house was foreclosed at that time).  4 years ago also had a very unkempt home with pet poop in the house. Last saw him in person a year ago.  []   1432 Patient is medically cleared [JH]      ED Course User Index  [JH] Diomedes Camilo MD         Diagnoses as of 01/08/25 2115   Housing insecurity   Anxiety   Passive suicidal ideations        Medical Decision Making  74-year-old male with past medical history significant for bipolar disorder, anxiety, panic disorder with agoraphobia presenting to the emergency department from an outpatient psychiatric facility. Patient is tachycardic with a heart rate in the 110s but is tremulous and appears anxious and vitals otherwise WNL without signs of infection on physical exam.  Patient does not appear to be psychotic or manic but per EMS report patient was pink slipped by Dr. Moore with outpatient psychiatry due to [facility] due to reports of self-harm and with concerns for acute flareup of anxiety " resulting in inability to safely care for himself out in the community. Patient has been admitted to inpatient psych in the past for flareups of both his bipolar disorder and anxiety which improves with medication adjustment.  Both I and my attending spoke with patient's son Tyshawn who lives in [place] states when he has sent people to check on his father recently he has not been doing well with poor self-care, poor hygiene and poor nutrition and thinks he is having a flareup of his severe anxiety and does not think he is safe to go home and is agreeable with plan for admission to inpatient psych for medication adjustment as he is not safe in the community, cannot safely take care of himself due to his anxiety and bipolar disorder as well as passive SI though he is not manic or psychotic on exam and currently denies SI. Patient pending placement by EPAT.  Patient's home meds including Lamictal, Zoloft and trazodone ordered. Signed out to attending physician Dr. Hobson pending placement by EPAT.    Patient and or family in agreement and understanding of treatment plan.  All questions answered.      I reviewed the case with the attending ED physician. The attending ED physician agrees with the plan. Patient and/or patient´s representative was counseled regarding labs, imaging, likely diagnosis, and plan. All questions were answered.    ED Medications administered this visit:    Medications   OLANZapine (ZyPREXA) injection 5 mg (5 mg intramuscular Not Given 1/8/25 1315)   traZODone (Desyrel) tablet 100 mg (has no administration in time range)   sodium chloride 0.9 % bolus 1,000 mL (0 mL intravenous Stopped 1/8/25 1934)   ALPRAZolam (Xanax) tablet 0.25 mg (0.25 mg oral Given 1/8/25 1321)   ALPRAZolam (Xanax) tablet 0.5 mg (0.5 mg oral Given 1/8/25 1850)   lamoTRIgine (LaMICtal) tablet 50 mg (50 mg oral Given 1/8/25 1916)   sertraline (Zoloft) tablet 150 mg (150 mg oral Given 1/8/25 1915)       New Prescriptions from this  visit:    New Prescriptions    No medications on file       Follow-up:  No follow-up provider specified.      Final Impression:   1. Housing insecurity    2. Anxiety    3. Passive suicidal ideations          (Please note that portions of this note were completed with a voice recognition program.  Efforts were made to edit the dictations but occasionally words are mis-transcribed.)     Dean Irvin DO  Resident  01/08/25 3879       Diomedes Camilo MD  01/10/25 1212

## 2025-01-08 NOTE — PROGRESS NOTES

## 2025-01-09 ENCOUNTER — HOSPITAL ENCOUNTER (INPATIENT)
Facility: HOSPITAL | Age: 75
LOS: 2 days | Discharge: HOME | End: 2025-01-11
Attending: PSYCHIATRY & NEUROLOGY | Admitting: PSYCHIATRY & NEUROLOGY
Payer: MEDICARE

## 2025-01-09 VITALS
HEART RATE: 80 BPM | SYSTOLIC BLOOD PRESSURE: 123 MMHG | DIASTOLIC BLOOD PRESSURE: 64 MMHG | TEMPERATURE: 97.7 F | BODY MASS INDEX: 31.39 KG/M2 | RESPIRATION RATE: 20 BRPM | WEIGHT: 200 LBS | HEIGHT: 67 IN | OXYGEN SATURATION: 96 %

## 2025-01-09 DIAGNOSIS — E55.9 VITAMIN D DEFICIENCY: Primary | ICD-10-CM

## 2025-01-09 DIAGNOSIS — R53.1 WEAKNESS: ICD-10-CM

## 2025-01-09 PROBLEM — F40.01 PANIC DISORDER WITH AGORAPHOBIA: Status: ACTIVE | Noted: 2025-01-09

## 2025-01-09 PROBLEM — F31.9 BIPOLAR DISORDER: Status: ACTIVE | Noted: 2023-10-23

## 2025-01-09 PROBLEM — T43.505A NEUROLEPTIC-INDUCED PARKINSONISM (MULTI): Status: RESOLVED | Noted: 2024-05-06 | Resolved: 2025-01-09

## 2025-01-09 PROBLEM — D50.9 IRON DEFICIENCY ANEMIA: Chronic | Status: ACTIVE | Noted: 2025-01-09

## 2025-01-09 PROBLEM — Z91.148 CONTROLLED SUBSTANCE AGREEMENT TERMINATED: Status: ACTIVE | Noted: 2025-01-09

## 2025-01-09 PROBLEM — F10.11 H/O ETOH ABUSE: Status: RESOLVED | Noted: 2025-01-09 | Resolved: 2025-01-09

## 2025-01-09 PROBLEM — F90.0 ADHD, PREDOMINANTLY INATTENTIVE TYPE: Status: RESOLVED | Noted: 2023-01-16 | Resolved: 2025-01-09

## 2025-01-09 PROBLEM — F31.81 BIPOLAR II DISORDER (MULTI): Status: ACTIVE | Noted: 2023-10-23

## 2025-01-09 PROBLEM — F10.11 H/O ETOH ABUSE: Status: ACTIVE | Noted: 2025-01-09

## 2025-01-09 PROBLEM — G21.11 NEUROLEPTIC-INDUCED PARKINSONISM (MULTI): Status: RESOLVED | Noted: 2024-05-06 | Resolved: 2025-01-09

## 2025-01-09 PROBLEM — Z87.898 HISTORY OF SEIZURES: Status: ACTIVE | Noted: 2025-01-09

## 2025-01-09 PROBLEM — F32.9 MDD (MAJOR DEPRESSIVE DISORDER): Status: ACTIVE | Noted: 2023-01-16

## 2025-01-09 LAB
ATRIAL RATE: 97 BPM
HOLD SPECIMEN: NORMAL
P AXIS: 63 DEGREES
P OFFSET: 199 MS
P ONSET: 145 MS
PR INTERVAL: 138 MS
Q ONSET: 214 MS
QRS COUNT: 16 BEATS
QRS DURATION: 88 MS
QT INTERVAL: 354 MS
QTC CALCULATION(BAZETT): 449 MS
QTC FREDERICIA: 415 MS
R AXIS: 55 DEGREES
T AXIS: 55 DEGREES
T OFFSET: 391 MS
VENTRICULAR RATE: 97 BPM

## 2025-01-09 PROCEDURE — 2500000001 HC RX 250 WO HCPCS SELF ADMINISTERED DRUGS (ALT 637 FOR MEDICARE OP): Performed by: PSYCHIATRY & NEUROLOGY

## 2025-01-09 PROCEDURE — 99223 1ST HOSP IP/OBS HIGH 75: CPT | Performed by: PSYCHIATRY & NEUROLOGY

## 2025-01-09 PROCEDURE — 99222 1ST HOSP IP/OBS MODERATE 55: CPT | Performed by: NURSE PRACTITIONER

## 2025-01-09 PROCEDURE — 97165 OT EVAL LOW COMPLEX 30 MIN: CPT | Mod: GO

## 2025-01-09 PROCEDURE — 1240000001 HC SEMI-PRIVATE BH ROOM DAILY

## 2025-01-09 PROCEDURE — 2500000002 HC RX 250 W HCPCS SELF ADMINISTERED DRUGS (ALT 637 FOR MEDICARE OP, ALT 636 FOR OP/ED): Performed by: PSYCHIATRY & NEUROLOGY

## 2025-01-09 PROCEDURE — 97150 GROUP THERAPEUTIC PROCEDURES: CPT | Mod: GO,CO

## 2025-01-09 RX ORDER — AMOXICILLIN 250 MG
2 CAPSULE ORAL 2 TIMES DAILY
Status: DISCONTINUED | OUTPATIENT
Start: 2025-01-09 | End: 2025-01-11 | Stop reason: HOSPADM

## 2025-01-09 RX ORDER — DIPHENHYDRAMINE HYDROCHLORIDE 50 MG/ML
50 INJECTION INTRAMUSCULAR; INTRAVENOUS ONCE AS NEEDED
Status: DISCONTINUED | OUTPATIENT
Start: 2025-01-09 | End: 2025-01-11 | Stop reason: HOSPADM

## 2025-01-09 RX ORDER — SERTRALINE HYDROCHLORIDE 50 MG/1
150 TABLET, FILM COATED ORAL DAILY
Status: DISCONTINUED | OUTPATIENT
Start: 2025-01-09 | End: 2025-01-11 | Stop reason: HOSPADM

## 2025-01-09 RX ORDER — TAMSULOSIN HYDROCHLORIDE 0.4 MG/1
0.4 CAPSULE ORAL DAILY
Status: DISCONTINUED | OUTPATIENT
Start: 2025-01-09 | End: 2025-01-11 | Stop reason: HOSPADM

## 2025-01-09 RX ORDER — TALC
3 POWDER (GRAM) TOPICAL NIGHTLY PRN
Qty: 30 TABLET | Refills: 2 | Status: CANCELLED | OUTPATIENT
Start: 2025-01-09 | End: 2025-04-09

## 2025-01-09 RX ORDER — VIT C/E/ZN/COPPR/LUTEIN/ZEAXAN 250MG-90MG
125 CAPSULE ORAL DAILY
Qty: 30 CAPSULE | Refills: 2 | Status: SHIPPED | OUTPATIENT
Start: 2025-01-09 | End: 2025-04-09

## 2025-01-09 RX ORDER — QUETIAPINE FUMARATE 25 MG/1
12.5 TABLET, FILM COATED ORAL EVERY 4 HOURS PRN
Status: DISCONTINUED | OUTPATIENT
Start: 2025-01-09 | End: 2025-01-11 | Stop reason: HOSPADM

## 2025-01-09 RX ORDER — GABAPENTIN 100 MG/1
100 CAPSULE ORAL NIGHTLY
Status: DISCONTINUED | OUTPATIENT
Start: 2025-01-09 | End: 2025-01-09

## 2025-01-09 RX ORDER — OLANZAPINE 10 MG/2ML
5 INJECTION, POWDER, FOR SOLUTION INTRAMUSCULAR EVERY 6 HOURS PRN
Status: DISCONTINUED | OUTPATIENT
Start: 2025-01-09 | End: 2025-01-11 | Stop reason: HOSPADM

## 2025-01-09 RX ORDER — CLONAZEPAM 0.5 MG/1
0.5 TABLET ORAL DAILY PRN
COMMUNITY
End: 2025-01-11 | Stop reason: HOSPADM

## 2025-01-09 RX ORDER — ACETAMINOPHEN 325 MG/1
650 TABLET ORAL EVERY 4 HOURS PRN
Status: DISCONTINUED | OUTPATIENT
Start: 2025-01-09 | End: 2025-01-11 | Stop reason: HOSPADM

## 2025-01-09 RX ORDER — OLANZAPINE 15 MG/1
15 TABLET ORAL NIGHTLY
COMMUNITY
End: 2025-01-11 | Stop reason: HOSPADM

## 2025-01-09 RX ORDER — TRAZODONE HYDROCHLORIDE 50 MG/1
150 TABLET ORAL NIGHTLY PRN
Status: DISCONTINUED | OUTPATIENT
Start: 2025-01-09 | End: 2025-01-09

## 2025-01-09 RX ORDER — HYDROXYZINE HYDROCHLORIDE 25 MG/1
25 TABLET, FILM COATED ORAL EVERY 4 HOURS PRN
Status: DISCONTINUED | OUTPATIENT
Start: 2025-01-09 | End: 2025-01-11 | Stop reason: HOSPADM

## 2025-01-09 RX ORDER — MULTIVITAMIN
1 TABLET ORAL DAILY
Qty: 30 TABLET | Refills: 2 | Status: CANCELLED | OUTPATIENT
Start: 2025-01-09 | End: 2025-04-09

## 2025-01-09 RX ORDER — TALC
3 POWDER (GRAM) TOPICAL NIGHTLY PRN
Status: DISCONTINUED | OUTPATIENT
Start: 2025-01-09 | End: 2025-01-11 | Stop reason: HOSPADM

## 2025-01-09 RX ORDER — DIPHENHYDRAMINE HCL 50 MG
50 CAPSULE ORAL EVERY 6 HOURS PRN
Status: DISCONTINUED | OUTPATIENT
Start: 2025-01-09 | End: 2025-01-11 | Stop reason: HOSPADM

## 2025-01-09 RX ORDER — LAMOTRIGINE 25 MG/1
50 TABLET ORAL 2 TIMES DAILY
Status: DISCONTINUED | OUTPATIENT
Start: 2025-01-09 | End: 2025-01-11 | Stop reason: HOSPADM

## 2025-01-09 RX ORDER — ALUMINUM HYDROXIDE, MAGNESIUM HYDROXIDE, AND SIMETHICONE 1200; 120; 1200 MG/30ML; MG/30ML; MG/30ML
30 SUSPENSION ORAL EVERY 6 HOURS PRN
Status: DISCONTINUED | OUTPATIENT
Start: 2025-01-09 | End: 2025-01-11 | Stop reason: HOSPADM

## 2025-01-09 RX ORDER — VIT C/E/ZN/COPPR/LUTEIN/ZEAXAN 250MG-90MG
125 CAPSULE ORAL DAILY
Qty: 30 CAPSULE | Refills: 2 | Status: CANCELLED | OUTPATIENT
Start: 2025-01-09 | End: 2025-04-09

## 2025-01-09 RX ORDER — TRAZODONE HYDROCHLORIDE 100 MG/1
200 TABLET ORAL NIGHTLY
Status: DISCONTINUED | OUTPATIENT
Start: 2025-01-09 | End: 2025-01-11 | Stop reason: HOSPADM

## 2025-01-09 RX ORDER — POLYETHYLENE GLYCOL 3350 17 G/17G
17 POWDER, FOR SOLUTION ORAL DAILY
Status: DISCONTINUED | OUTPATIENT
Start: 2025-01-09 | End: 2025-01-11 | Stop reason: HOSPADM

## 2025-01-09 RX ORDER — VIT C/E/ZN/COPPR/LUTEIN/ZEAXAN 250MG-90MG
5000 CAPSULE ORAL DAILY
Status: DISCONTINUED | OUTPATIENT
Start: 2025-01-09 | End: 2025-01-11 | Stop reason: HOSPADM

## 2025-01-09 RX ORDER — TRAZODONE HYDROCHLORIDE 100 MG/1
200 TABLET ORAL NIGHTLY PRN
Status: DISCONTINUED | OUTPATIENT
Start: 2025-01-09 | End: 2025-01-09

## 2025-01-09 RX ADMIN — SENNOSIDES AND DOCUSATE SODIUM 2 TABLET: 50; 8.6 TABLET ORAL at 21:22

## 2025-01-09 RX ADMIN — TRAZODONE HYDROCHLORIDE 200 MG: 100 TABLET ORAL at 21:22

## 2025-01-09 RX ADMIN — LAMOTRIGINE 50 MG: 25 TABLET ORAL at 08:46

## 2025-01-09 RX ADMIN — LAMOTRIGINE 50 MG: 25 TABLET ORAL at 21:22

## 2025-01-09 RX ADMIN — SERTRALINE HYDROCHLORIDE 150 MG: 50 TABLET ORAL at 08:46

## 2025-01-09 SDOH — SOCIAL STABILITY: SOCIAL INSECURITY: HAS ANYONE EVER THREATENED TO HURT YOUR FAMILY OR YOUR PETS?: NO

## 2025-01-09 SDOH — SOCIAL STABILITY: SOCIAL NETWORK: HOW OFTEN DO YOU ATTEND MEETINGS OF THE CLUBS OR ORGANIZATIONS YOU BELONG TO?: NEVER

## 2025-01-09 SDOH — HEALTH STABILITY: MENTAL HEALTH: EXPERIENCED ANY OF THE FOLLOWING LIFE EVENTS: DEATH OF FAMILY/FRIEND;OTHER (COMMENT)

## 2025-01-09 SDOH — SOCIAL STABILITY: SOCIAL INSECURITY
WITHIN THE LAST YEAR, HAVE YOU BEEN RAPED OR FORCED TO HAVE ANY KIND OF SEXUAL ACTIVITY BY YOUR PARTNER OR EX-PARTNER?: NO

## 2025-01-09 SDOH — ECONOMIC STABILITY: FOOD INSECURITY
WITHIN THE PAST 12 MONTHS, YOU WORRIED THAT YOUR FOOD WOULD RUN OUT BEFORE YOU GOT THE MONEY TO BUY MORE.: SOMETIMES TRUE

## 2025-01-09 SDOH — SOCIAL STABILITY: SOCIAL INSECURITY: WITHIN THE LAST YEAR, HAVE YOU BEEN AFRAID OF YOUR PARTNER OR EX-PARTNER?: NO

## 2025-01-09 SDOH — HEALTH STABILITY: PHYSICAL HEALTH: ON AVERAGE, HOW MANY DAYS PER WEEK DO YOU ENGAGE IN MODERATE TO STRENUOUS EXERCISE (LIKE A BRISK WALK)?: 0 DAYS

## 2025-01-09 SDOH — SOCIAL STABILITY: SOCIAL INSECURITY: ARE YOU MARRIED, WIDOWED, DIVORCED, SEPARATED, NEVER MARRIED, OR LIVING WITH A PARTNER?: PATIENT UNABLE TO ANSWER

## 2025-01-09 SDOH — SOCIAL STABILITY: SOCIAL INSECURITY: HAVE YOU HAD THOUGHTS OF HARMING ANYONE ELSE?: NO

## 2025-01-09 SDOH — SOCIAL STABILITY: SOCIAL INSECURITY: WERE YOU ABLE TO COMPLETE ALL THE BEHAVIORAL HEALTH SCREENINGS?: YES

## 2025-01-09 SDOH — HEALTH STABILITY: MENTAL HEALTH
DO YOU FEEL STRESS - TENSE, RESTLESS, NERVOUS, OR ANXIOUS, OR UNABLE TO SLEEP AT NIGHT BECAUSE YOUR MIND IS TROUBLED ALL THE TIME - THESE DAYS?: VERY MUCH

## 2025-01-09 SDOH — SOCIAL STABILITY: SOCIAL INSECURITY
WITHIN THE LAST YEAR, HAVE YOU BEEN KICKED, HIT, SLAPPED, OR OTHERWISE PHYSICALLY HURT BY YOUR PARTNER OR EX-PARTNER?: NO

## 2025-01-09 SDOH — HEALTH STABILITY: MENTAL HEALTH: BEHAVIORS/MOOD: CALM;COOPERATIVE

## 2025-01-09 SDOH — SOCIAL STABILITY: SOCIAL INSECURITY: HAVE YOU HAD ANY THOUGHTS OF HARMING ANYONE ELSE?: NO

## 2025-01-09 SDOH — SOCIAL STABILITY: SOCIAL NETWORK
DO YOU BELONG TO ANY CLUBS OR ORGANIZATIONS SUCH AS CHURCH GROUPS, UNIONS, FRATERNAL OR ATHLETIC GROUPS, OR SCHOOL GROUPS?: YES

## 2025-01-09 SDOH — SOCIAL STABILITY: SOCIAL INSECURITY: ARE YOU OR HAVE YOU BEEN THREATENED OR ABUSED PHYSICALLY, EMOTIONALLY, OR SEXUALLY BY ANYONE?: NO

## 2025-01-09 SDOH — SOCIAL STABILITY: SOCIAL INSECURITY: DO YOU FEEL UNSAFE GOING BACK TO THE PLACE WHERE YOU ARE LIVING?: NO

## 2025-01-09 SDOH — HEALTH STABILITY: MENTAL HEALTH: BEHAVIORAL HEALTH(WDL): EXCEPTIONS TO WDL

## 2025-01-09 SDOH — ECONOMIC STABILITY: FOOD INSECURITY: WITHIN THE PAST 12 MONTHS, THE FOOD YOU BOUGHT JUST DIDN'T LAST AND YOU DIDN'T HAVE MONEY TO GET MORE.: SOMETIMES TRUE

## 2025-01-09 SDOH — ECONOMIC STABILITY: INCOME INSECURITY: IN THE PAST 12 MONTHS HAS THE ELECTRIC, GAS, OIL, OR WATER COMPANY THREATENED TO SHUT OFF SERVICES IN YOUR HOME?: NO

## 2025-01-09 SDOH — SOCIAL STABILITY: SOCIAL NETWORK: HOW OFTEN DO YOU ATTEND CHURCH OR RELIGIOUS SERVICES?: NEVER

## 2025-01-09 SDOH — SOCIAL STABILITY: SOCIAL NETWORK: EMOTIONAL SUPPORT GIVEN: REASSURE

## 2025-01-09 SDOH — HEALTH STABILITY: MENTAL HEALTH: BEHAVIORS/MOOD: CALM

## 2025-01-09 SDOH — SOCIAL STABILITY: SOCIAL INSECURITY: WITHIN THE LAST YEAR, HAVE YOU BEEN HUMILIATED OR EMOTIONALLY ABUSED IN OTHER WAYS BY YOUR PARTNER OR EX-PARTNER?: NO

## 2025-01-09 SDOH — SOCIAL STABILITY: SOCIAL INSECURITY: ABUSE: ADULT

## 2025-01-09 SDOH — SOCIAL STABILITY: SOCIAL INSECURITY: ARE THERE ANY APPARENT SIGNS OF INJURIES/BEHAVIORS THAT COULD BE RELATED TO ABUSE/NEGLECT?: NO

## 2025-01-09 SDOH — SOCIAL STABILITY: SOCIAL NETWORK: IN A TYPICAL WEEK, HOW MANY TIMES DO YOU TALK ON THE PHONE WITH FAMILY, FRIENDS, OR NEIGHBORS?: PATIENT UNABLE TO ANSWER

## 2025-01-09 SDOH — SOCIAL STABILITY: SOCIAL INSECURITY: DOES ANYONE TRY TO KEEP YOU FROM HAVING/CONTACTING OTHER FRIENDS OR DOING THINGS OUTSIDE YOUR HOME?: NO

## 2025-01-09 SDOH — HEALTH STABILITY: MENTAL HEALTH: NEEDS EXPRESSED: EMOTIONAL

## 2025-01-09 SDOH — SOCIAL STABILITY: SOCIAL NETWORK: HOW OFTEN DO YOU GET TOGETHER WITH FRIENDS OR RELATIVES?: PATIENT UNABLE TO ANSWER

## 2025-01-09 SDOH — HEALTH STABILITY: PHYSICAL HEALTH: ON AVERAGE, HOW MANY MINUTES DO YOU ENGAGE IN EXERCISE AT THIS LEVEL?: 0 MIN

## 2025-01-09 SDOH — SOCIAL STABILITY: SOCIAL INSECURITY: DO YOU FEEL ANYONE HAS EXPLOITED OR TAKEN ADVANTAGE OF YOU FINANCIALLY OR OF YOUR PERSONAL PROPERTY?: NO

## 2025-01-09 SDOH — HEALTH STABILITY: PHYSICAL HEALTH
HOW OFTEN DO YOU NEED TO HAVE SOMEONE HELP YOU WHEN YOU READ INSTRUCTIONS, PAMPHLETS, OR OTHER WRITTEN MATERIAL FROM YOUR DOCTOR OR PHARMACY?: SOMETIMES

## 2025-01-09 SDOH — SOCIAL STABILITY: SOCIAL NETWORK

## 2025-01-09 ASSESSMENT — LIFESTYLE VARIABLES
HOW MANY STANDARD DRINKS CONTAINING ALCOHOL DO YOU HAVE ON A TYPICAL DAY: PATIENT DOES NOT DRINK
ORIENTATION AND CLOUDING OF SENSORIUM: ORIENTED AND CAN DO SERIAL ADDITIONS
AUDIT-C TOTAL SCORE: 0
VISUAL DISTURBANCES: NOT PRESENT
HOW OFTEN DO YOU HAVE A DRINK CONTAINING ALCOHOL: NEVER
HOW OFTEN DO YOU HAVE 6 OR MORE DRINKS ON ONE OCCASION: NEVER
AGITATION: NORMAL ACTIVITY
AUDIT-C TOTAL SCORE: 0
SUBSTANCE_ABUSE_PAST_12_MONTHS: NO
AUDIT-C TOTAL SCORE: 0
HEADACHE, FULLNESS IN HEAD: NOT PRESENT
PRESCIPTION_ABUSE_PAST_12_MONTHS: NO
AUDITORY DISTURBANCES: NOT PRESENT
HOW OFTEN DO YOU HAVE A DRINK CONTAINING ALCOHOL: NEVER
HOW MANY STANDARD DRINKS CONTAINING ALCOHOL DO YOU HAVE ON A TYPICAL DAY: PATIENT DOES NOT DRINK
CIWA TOTAL SCORE: 4
PRESCIPTION_ABUSE_PAST_12_MONTHS: NO
SKIP TO QUESTIONS 9-10: 1
NAUSEA AND VOMITING: NO NAUSEA AND NO VOMITING
TOTAL_SCORE: 3
AUDIT-C TOTAL SCORE: 0
TREMOR: NO TREMOR
SUBSTANCE_ABUSE_PAST_12_MONTHS: NO
ANXIETY: MODERATELY ANXIOUS, OR GUARDED, SO ANXIETY IS INFERRED
SKIP TO QUESTIONS 9-10: 1
HOW OFTEN DO YOU HAVE 6 OR MORE DRINKS ON ONE OCCASION: NEVER
PAROXYSMAL SWEATS: NO SWEAT VISIBLE

## 2025-01-09 ASSESSMENT — COLUMBIA-SUICIDE SEVERITY RATING SCALE - C-SSRS
6. HAVE YOU EVER DONE ANYTHING, STARTED TO DO ANYTHING, OR PREPARED TO DO ANYTHING TO END YOUR LIFE?: NO
6. HAVE YOU EVER DONE ANYTHING, STARTED TO DO ANYTHING, OR PREPARED TO DO ANYTHING TO END YOUR LIFE?: NO
2. HAVE YOU ACTUALLY HAD ANY THOUGHTS OF KILLING YOURSELF?: NO
2. HAVE YOU ACTUALLY HAD ANY THOUGHTS OF KILLING YOURSELF?: NO
6. HAVE YOU EVER DONE ANYTHING, STARTED TO DO ANYTHING, OR PREPARED TO DO ANYTHING TO END YOUR LIFE?: NO
1. SINCE LAST CONTACT, HAVE YOU WISHED YOU WERE DEAD OR WISHED YOU COULD GO TO SLEEP AND NOT WAKE UP?: NO
2. HAVE YOU ACTUALLY HAD ANY THOUGHTS OF KILLING YOURSELF?: NO

## 2025-01-09 ASSESSMENT — ACTIVITIES OF DAILY LIVING (ADL)
HEARING - RIGHT EAR: FUNCTIONAL
LACK_OF_TRANSPORTATION: YES
BATHING: INDEPENDENT
PATIENT'S MEMORY ADEQUATE TO SAFELY COMPLETE DAILY ACTIVITIES?: YES
HEARING - LEFT EAR: FUNCTIONAL
JUDGMENT_ADEQUATE_SAFELY_COMPLETE_DAILY_ACTIVITIES: YES
LACK_OF_TRANSPORTATION: NO
WALKS IN HOME: INDEPENDENT
ADEQUATE_TO_COMPLETE_ADL: YES
ASSISTIVE_DEVICE: EYEGLASSES
TOILETING: INDEPENDENT
DRESSING YOURSELF: INDEPENDENT
GROOMING: INDEPENDENT
FEEDING YOURSELF: INDEPENDENT

## 2025-01-09 ASSESSMENT — PAIN SCALES - GENERAL
PAINLEVEL_OUTOF10: 3
PAINLEVEL_OUTOF10: 0 - NO PAIN

## 2025-01-09 ASSESSMENT — PATIENT HEALTH QUESTIONNAIRE - PHQ9
SUM OF ALL RESPONSES TO PHQ9 QUESTIONS 1 & 2: 2
1. LITTLE INTEREST OR PLEASURE IN DOING THINGS: SEVERAL DAYS
2. FEELING DOWN, DEPRESSED OR HOPELESS: SEVERAL DAYS

## 2025-01-09 ASSESSMENT — PAIN - FUNCTIONAL ASSESSMENT
PAIN_FUNCTIONAL_ASSESSMENT: 0-10

## 2025-01-09 ASSESSMENT — PAIN DESCRIPTION - DESCRIPTORS: DESCRIPTORS: ACHING

## 2025-01-09 NOTE — GROUP NOTE
"Group Topic: Excercise/Physical    Group Date: 1/9/2025  Start Time: 1400  End Time: 1500  Facilitators: CRYSTAL Palafox   Department: [facility] REHAB THERAPY VIRTUAL    Number of Participants: 11   Group Focus: Physical Activity, Exercise   Treatment Modality: Recreation Therapy  Interventions utilized were: Full Body Stretching, Therabands, Stress Balls, group exercise and leisure development  Purpose: Physical Activity, coping skills, self-worth, and self-care    Name: Ryan Rasheed YOB: 1950   MR: [id number]      Facilitator: Recreational Therapist  Level of Participation: active  Quality of Participation: cooperative and quiet  Interactions with others:  minimal  Mood/Affect: appropriate and pleasant  Triggers (if applicable): N/A  Cognition: processing slowly and capable  Progress: Moderate  Comments: Stretching exercises included three different stretches in each category which included neck/shoulders, shoulders/arms, upper body/back, and lower extremity. Participants were explained each movement, provided a demonstration, adapted for performance level, and reminded to stop if feeling discomfort and/or pain. Group also involved UE theraband exercises.      Patient arrived slightly after the session started. While in attendance he completed about 80% of the stretches. During theraband exercises he needed supervision with some verbal prompts/cues and he completed about 90% of the exercises. 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 Patient talked about \"head down assisting with headaches\". Patient appeared less anxious and much brighter than earlier today.     Plan: continue with services      "

## 2025-01-09 NOTE — NURSING NOTE
Pt pleasant and cooperative with care. Pt stayed in room throughout the day. Pt out for meals this shift. Pt shaved head and face with the help of staff. Q15 minute safety checks maintained.

## 2025-01-09 NOTE — PROGRESS NOTES
"EPAT - Social Work Psychiatric Assessment    Arrival Details  Mode of Arrival: Ambulance  Admission Source: Emergency department  Admission Type: Involuntary  EPAT Assessment Start Date: 01/08/25  EPAT Assessment Start Time: 1744  Name of : FRANCISCO Villagran    History of Present Illness  Admission Reason: Psychiatric Evaluation  HPI: Pt is a 74-year-old  white male, with a hx of unspecified depression, bipolar disorder, TANI, panic disorder with agoraphobia, and Alcohol use disorder. Report states \"presenting to the emergency department from an outpatient psychiatric facility.  Patient was pink slipped at that facility for passive SI without a plan and was sent to the emergency department for EPAT evaluation.  Patient adamantly denies any thoughts of wanting to hurt himself or anyone else and also denies auditory or visual hallucinations.  Patient states he is here because he wants to speak to a  as he is concerned, he is going to lose his house.  Attending physician called both patient's son and also the attending physician at patient's outpatient psychiatric facility who sent him here who confirmed both pink slip for passive SI and also the son states that patient has not been doing well at home with self-care, hydration and nutrition and he has had prior flareups of bipolar and anxiety that presented similarly.\" Provider identified as [facility]. Medication outlined as acetaminophen, cholecalciferol, gabapentin, hydroxyzine hcl, lamotrigine, melatonin, multivitamin, polyethylene glycol, sennosides-docusate sodium, sertraline/zoloft, tamsulosin per ED report 1/8/25. Last Psych Hospitalization was approximately 6-7 years ago, per pt report. UTOX normal, BAL normal. No indicated risk. Provider, triage, [place], and external notes reviewed.    SW Readmission Information   Readmission within 30 Days: No    Psychiatric Symptoms  Anxiety Symptoms: Generalized, Panic attack, Feelings of doom, " Obsessions  Depression Symptoms: Appetite change, Change in energy level, Feelings of helplessness, Increased irritability  Deysi Symptoms: Pressured speech    Psychosis Symptoms  Hallucination Type: No problems reported or observed. (Denies AVH/commands.)  Delusion Type:  (Pt son reports psychiatry concerns of delusion, reports that pt perception of concerns are elevated.)    Additional Symptoms - Adult  Generalized Anxiety Disorder: Difficult to control worry, Excessive anxiety/worry, Difficulity concentrating  Obsessive Compulsive Disorder: No problems reported or observed.  Panic Attack: Choking/swallowing difficulity, Other (Comment)  Post Traumatic Stress Disorder: No problems reported or observed. (Denies)  Delirium: Acute inattention, Change in speech (Pt was hard to redirect, pt centered on percevied eviction pressured speech.)  Review of Symptoms Comments: Please review above.    Past Psychiatric History/Meds/Treatments  Past Psychiatric History: Hx of unspecified depression, bipolar disorder, TANI, panic disorder with agoraphobia, and Alcohol use disorder. Provider identified as [facility]. Medication outlined as acetaminophen, cholecalciferol, gabapentin, hydroxyzine hcl, lamotrigine, melatonin, multivitamin, polyethylene glycol, sennosides-docusate sodium, sertraline/zoloft, tamsulosin per ED report 1/8/25. Last Psych Hospitalization was approximately 6-7 years ago, per pt report.  Past Psychiatric Meds/Treatments: Hx of unspecified depression, bipolar disorder, TANI, panic disorder with agoraphobia, and Alcohol use disorder. Provider identified as [facility]. Medication outlined as acetaminophen, cholecalciferol, gabapentin, hydroxyzine hcl, lamotrigine, melatonin, multivitamin, polyethylene glycol, sennosides-docusate sodium, sertraline/zoloft, tamsulosin per ED report 1/8/25. Last Psych Hospitalization was approximately 6-7 years ago, per pt report.  Past Violence/Victimization History: Unable to assess, pt was  unable to be redirected.    Current Mental Health Contacts   Name/Phone Number: N/A   Last Appointment Date: N/A  Provider Name/Phone Number: [facility]/[phone]  Provider Last Appointment Date: 01/08/24    Support System: Immediate family, Friends (Children and friends)    Living Arrangement: Lives alone    Home Safety  Feels Safe Living in Home: Yes  Potentially Unsafe Housing Conditions:  (Pt reports home to be safe however have concerns surrounding possible evictions and shut off notices of services.)  Home Safety : Please review above.    Income Information  Employment Status for: Patient  Employment Status: Unemployed, Other (Comment) (Recieves SSI)  Income Source: Social Security (reports 1400 monthly.)  Current/Previous Occupation: Unable to Assess  Income/Expense Information: Significantly in debt/bankrupt  Financial Concerns: Unable to Pay Bills  Who Manages Finances if Patient Unable: Pt manages own finances.  Employment/ Finance Comments: Please review above.    [place] Service/Education History  Current or Previous  Service: None (Denies.)   Experience:  (Pt denies.)  Education Level:  (Unable to assess, pt unable to be redirected.)  History of Learning Problems:  (Unable to assess, pt unable to be redirected.)  History of School Behavior Problems:  (Unable to assess, pt unable to be redirected.)  School History: Please review above.    Social/Cultural History  Social History: Pt has Tyshawn HAINES. Pt report finacial stressors, surround social determinants of health. Pt has a past history of hospitalizations, last one being approximately 6-7 years per pt report.  Cultural Requests During Hospitalization: Denies  Spiritual Requests During Hospitalization: Denies  Important Activities: Family, Social (Reports having a reason to live surround friends and family interaction.)    Legal  Assistance with Managing/Advocating Healthcare Needs: Other (Comment) (POA Tyshawn  Kathya.)  Criminal Activity/ Legal Involvement Pertinent to Current Situation/ Hospitalization: Concerns surround finacial capabilities in sustaining housing.  Legal Concerns: Possible evictions per pt report.  Legal Comments: Possible evictions per pt report.    Drug Screening  Have you used any substances (canabis, cocaine, heroin, hallucinogens, inhalants, etc.) in the past 12 months?: No  Have you used any prescription drugs other than prescribed in the past 12 months?: No  Is a toxicology screen needed?: Yes         Behavioral Health  Parent/Guardian/Significant Other Involvement: Attentive to patient needs  Family Behaviors: Supportive, Cooperative, Appropriate for situation         General Appearance  Speech Pattern: Pressured, Rapid, Repetitive  General Attitude: Attentive, Cooperative, Interested  Appearance/Hygiene: Unable to assess (Only able to see pt from neck up.)    Thought Process  Coherency: [place] thinking  Content: Delusions  Delusions: Controlling  Perception: Not altered  Hallucination: None (denies)  Judgment/Insight: Limited  Confusion: None  Cognition: Appropriate judgement, Appropriate safety awareness, Appropriate attention/concentration    Sleep Pattern  Sleep Pattern:  (reports no concerns.)    Risk Factors  Self Harm/Suicidal Ideation Plan: Pt reports no SI/HI hx, however does disclose prior psych hospitalizations reporting last one being 6-7 years ago. Son report pt has had hospitalizations out of state.  Previous Self Harm/Suicidal Plans: Pt reports no SI/HI, or past SI/HI. Son reports some self harm, however is not able to provide means.  Risk Factors: Lower socioeconomic status, Major mental illness, Unemployment    Violence Risk Assessment  Assessment of Violence: None noted (pt medicated due to hitting self.)  Thoughts of Harm to Others: No    Ability to Assess Risk Screen  Risk Screen - Ability to Assess: Able to be screened  Ask Suicide-Screening Questions  1. In the past few  weeks, have you wished you were dead?: No  2. In the past few weeks, have you felt that you or your family would be better off if you were dead?: No  3. In the past week, have you been having thoughts about killing yourself?: No  4. Have you ever tried to kill yourself?: No  5. Are you having thoughts of killing yourself right now?: No  Calculated Risk Score: No intervention is necessary  [place] Suicide Severity Rating Scale (Screener/Recent Self-Report)  1. Wish to be Dead (Past 1 Month): No  2. Non-Specific Active Suicidal Thoughts (Past 1 Month): No  6. Suicidal Behavior (Lifetime): No  Calculated C-SSRS Risk Score (Lifetime/Recent): No Risk Indicated  Step 1: Risk Factors  Current & Past Psychiatric Dx: Mood disorder  Presenting Symptoms: Anxiety and/or panic  Family History: Other (Comment) (Unable to assess, pt not able to be redirected.)  Precipitants/Stressors: Other (Comment) (Finacial concerns.)  Change in Treatment: Hopeless or dissatisfied with provider or treatment (Pt unhappy with pink slip from outpatient provider.)  Access to Lethal Methods : No  Step 2: Protective Factors   Protective Factors Internal: Identifies reasons for living, Fear of death or the actual act of killing self  Protective Factors External: Supportive social network or family or friends, Positive therapeutic relationships  Step 3: Suicidal Ideation Intensity  Most Severe Suicidal Ideation Identified: Unable to assess, pt not able to be redirected.  How Many Times Have You Had These Thoughts: Less than once a week  When You Have the Thoughts How Long do They Last : Fleeting - few seconds or minutes  Could/Can You Stop Thinking About Killing Yourself or Wanting to Die if You Want to: Does not attempt to control thoughts  Are There Things - Anyone or Anything - That Stopped You From Wanting to Die or Acting on: Does not apply  What Sort of Reasons Did You Have For Thinking About Wanting to Die or Killing Yourself: Does not  "apply  Total Score: 2  Step 5: Documentation  Risk Level: Low suicide risk    Psychiatric Impression and Plan of Care  Specific Resources Provided to Patient: Upon assessment pt was observed agitated, cooperative and interative AOx4. Pt reports being seen in ED following meeting outpatient psychiatry and venting about financial stressors. Pt denies SI/HI, intent, plan, AVH/commands, and access to guns. Pt was PS by outpatient provider with report that pt is \"anxious distress, at risk of losing his house, not sleeping, not eating well, poor self-care, passive SI-no intention or plan.\" ED reports that ambulance observed fecies in pt home, from not cleaning behind animals. Pt report depressed moods, and decreased appetite with some weight loss. Energy level was described as low and decreased. Pt reports having a hx of panic attacks, and reports still occurring. Pt was ruminating over being evicted, son report that though this is concernt it is not imminent.  Son states that pt has not been eating, neighbor reported to son that pt is only consuming a bowl of oatmeal a day. Pt report being in compliant with medication however, psych report indicates pt has not been taking zyplexa.     Diagnosis:  TANI  CM Notified: Reports none.  PHP/IOP Recommended: N/A  Specific Information Provided for PHP/IOP: N/A  Plan Comments: Pt recommended for admission, per outpatient provider PS.    Outcome/Disposition  Patient's Perception of Outcome Achieved: Pt would just like to be discharged by monday for appt with senior living.  Assessment, Recommendations and Risk Level Reviewed with: Dean Irvin,  Contact Name: Tyshawn Rasheed  Contact Number(s): [phone]  Contact Relationship: Son/POA  EPAT Assessment Completed Date: 01/08/25  EPAT Assessment Completed Time: 1917      "

## 2025-01-09 NOTE — PROGRESS NOTES
Occupational Therapy     REHAB Therapy Assessment & Treatment    Patient Name: Ryan Rasheed  MRN: [id number]  Today's Date: 1/9/2025      Time In: 0902 Time Out: 0910  Date of OT Referral: 1/9/24   Admission Diagnosis: anxiety   Reason for Referral: psych  General Information   Past Medical History/History of Present Illness: unspecified depression, bipolar disorder, TANI, panic disorder with agoraphobia, and Alcohol use disorder    Pain: 0/10   Precautions: none   Appearance: pt sitting quietly at EOB upon arrival to evaluation   Initial Response to Eval: pt receptive to information, mostly providing simple one-word answers to prompts. May require encouragement to participate in OT POC   Current Stressors: possibility of homelessness, finances  Personal History   Marital Status:    Community Support: reports seeing psychiatrist once every 2 weeks   Support System: good social support from son   Living Situation: at home alone, per EMR pt with poor living conditions but pt not concerned   Home Set-Up: poor living conditions, states he will be getting evicted in the coming months   DME Used and/or Owned: none   Prior Level of Function: independent   Level of Education: unable to assess   Employment Status: unemployed, on SSI, has financial concerns   Leisure Interests: music, staying busy  Psychosocial/Cognition Assessment   Orientation: AOx4   Attention: WFL   Follows Commands: WFL   Mood: demonstrates high anxiety but presents wit stable, flat mood   Safety Awareness: impaired as evidenced by living conditions, passive SI statements   Patient Identified Life Roles: pt unable to describe any significant life roles   Patient Identified Goals-Short Term: find better housing with SW     Long Term: unable to identify  Perceptual/Communication Skills   Speech (dysarthric, exp/rec aphasia, pressured, etc.): WFL   Vision: WFL   Perception (inattention, delusions, hallucinations, suicidal, etc): does not reports  "AH/VH, denies SI   Reality Based Behavior: WFL   Perseverations: none   Social Skills/Behavior: pt cooperative, responds with one word answers, requires cues to participate in conversation. May require additional encouragement to participate in OT POC and group activities  Basic Functional Mobility   Device Used: none   Bed Mobility: independent   Transfer Status: independent   Ambulatory Status:  independent   Balance: WFL   Endurance: WFL  Activities of Daily Living   Grooming/Hygiene: independent   Dressing: independent   Bathing: independent   Toileting: independent   Sleep: WFL  Instrumental Activities of Daily Living   Medication Management/Compliance: reports compliance with all medication regimes but unable to describe routine   Managing Finances: impaired, fears he will be evicted; states he is supposed to have a meeting Monday with somebody regarding moving into an assisted living, unable to describe details of the meeting   Patient Reported Daily Routine/Responsibility: unable to identify components of daily routine but states he spends his time \"staying busy around the house at home\"  Emotional/Mental Health Management   Patient Identified Coping Skills- Positive: listen to music      Negative: none identified   Knowledge of Illness/Emotions: pt appears to lack insight into needs, current situation, current state of mental health   Stress Management: Maximally impaired, would benefit from exploration of stress management techniques and coping skills   Depression/Anxiety Management: Maximally impaired, would benefit from exploration of stress management techniques and coping skills   Relapse Prevention Skills/Supports: Pt would benefit from exploration of community resources to use for post-discharge carryover of learned skills.       Encounter Problems       Encounter Problems (Active)       OT Goals       Pt will demo increased activity level by attending 8-10 groups per week.        Start:  01/09/25    " Expected End:  01/30/25            Pt will explore and ID 1-2 strategies to manage stressors/symptoms of illness/ grief more effectively prior to discharge.        Start:  01/09/25    Expected End:  01/30/25            Pt will ID/ utilize 1-2 ways to increase balance of activity/ re-involve self in functional daily routines/roles prior to discharge.        Start:  01/09/25    Expected End:  01/30/25            Pt will ID 2-3 effective ways to distract from crisis and ID stressors/ personal symptoms of stress in order to improve management of stress during daily activities.        Start:  01/09/25    Expected End:  01/30/25            Pt will ID 2 community resources/programs to join/attend after D/C to improve their support system       Start:  01/09/25    Expected End:  01/30/25                     Education Documentation  No documentation found.  Education Comments  No comments found.          Additional Comments:

## 2025-01-09 NOTE — NURSING NOTE
Patient admitted to the [facility] from [facility] ED for Anxiety and passive SI.  Patient is not clear on the reason for admission but is mostly concerned with finding a place to live because he is going to be evicted from his home.  Patient states he has a memory loss and becomes nervous when asked to complete the mini mental exam.  He rates his anxiety and depression both 10/10, denies any suicidal ideations and hallucinations.  Complains of some OA pain in his left hip.  His coping skill is music, his strength is having the ability to make others laugh and he is musical talent.  No PRN medications were administered.  Patient had a snack and was able to go in his room to rest.

## 2025-01-09 NOTE — SIGNIFICANT EVENT
[id number]   Able to Complete Psychiatric Screening   Were you able to complete all the behavioral health screenings? Yes   Abuse Screen   Abuse Screen Adult   Have you had any thoughts of harming anyone else? No   Are you or have you been threatened or abused physically, emotionally, or sexually by anyone? No   Has anyone ever threatened to hurt your family or your pets? No   Does anyone try to keep you from having/contacting other friends or doing things outside your home? No   Do you feel UNSAFE going back to the place where you are living? No   Do you feel anyone has exploited or taken advantage of you financially or of your personal property? No   Are there any apparent signs of injuries/behaviors that could be related to abuse/neglect? No   Trauma/Abuse Assessment   Physical Abuse Denies   Verbal Abuse Denies   Experienced Any of the Following Life Events Death of family/friend;Other (Comment)  (pt's older brother  age 21 in car accident when pt age 19; pt stated this event was very traumatic for him.)   Drug Screening   Have you used any substances (canabis, cocaine, heroin, hallucinogens, inhalants, etc.) in the past 12 months? No   Have you used any prescription drugs other than prescribed in the past 12 months? No   Is a toxicology screen needed? Yes   Audit Alcohol Screening   Q1: How often do you have a drink containing alcohol? Never  (last had a drink about 3 years ago; has not had a drink since.)   Q2: How many drinks containing alcohol do you have on a typical day when you are drinking? None   Q3: How often do you have six or more drinks on one occasion? Never   Audit-C Score 0   Patient Strengths/Barriers   Strengths (Must Choose Two) Education;Sense of humor;Support from family;Support from friends   Barriers Other (Comment)  (history of bipolar illness)   Consults    Consult Needed Yes (Comment)   Spiritual Care Consult Needed No     (Per EPAT:          FRANCISCO Rendon  "    Specialty:      Progress Notes      Signed     Date of Service: 1/8/2025  7:18 PM     Signed         EPAT - Social Work Psychiatric Assessment     Arrival Details  Mode of Arrival: Ambulance  Admission Source: Emergency department  Admission Type: Involuntary  EPAT Assessment Start Date: 01/08/25  EPAT Assessment Start Time: 1744  Name of : FRANCISCO Villagran     History of Present Illness  Admission Reason: Psychiatric Evaluation  HPI: Pt is a 74-year-old  white male, with a hx of unspecified depression, bipolar disorder, TANI, panic disorder with agoraphobia, and Alcohol use disorder. Report states \"presenting to the emergency department from an outpatient psychiatric facility.  Patient was pink slipped at that facility for passive SI without a plan and was sent to the emergency department for EPAT evaluation.  Patient adamantly denies any thoughts of wanting to hurt himself or anyone else and also denies auditory or visual hallucinations.  Patient states he is here because he wants to speak to a  as he is concerned, he is going to lose his house.  Attending physician called both patient's son and also the attending physician at patient's outpatient psychiatric facility who sent him here who confirmed both pink slip for passive SI and also the son states that patient has not been doing well at home with self-care, hydration and nutrition and he has had prior flareups of bipolar and anxiety that presented similarly.\" Provider identified as [facility]. Medication outlined as acetaminophen, cholecalciferol, gabapentin, hydroxyzine hcl, lamotrigine, melatonin, multivitamin, polyethylene glycol, sennosides-docusate sodium, sertraline/zoloft, tamsulosin per ED report 1/8/25. Last Psych Hospitalization was approximately 6-7 years ago, per pt report. UTOX normal, BAL normal. No indicated risk. Provider, triage, Logan, and external notes reviewed.     SW " Readmission Information   Readmission within 30 Days: No     Psychiatric Symptoms  Anxiety Symptoms: Generalized, Panic attack, Feelings of doom, Obsessions  Depression Symptoms: Appetite change, Change in energy level, Feelings of helplessness, Increased irritability  Deysi Symptoms: Pressured speech     Psychosis Symptoms  Hallucination Type: No problems reported or observed. (Denies AVH/commands.)  Delusion Type:  (Pt son reports psychiatry concerns of delusion, reports that pt perception of concerns are elevated.)     Additional Symptoms - Adult  Generalized Anxiety Disorder: Difficult to control worry, Excessive anxiety/worry, Difficulity concentrating  Obsessive Compulsive Disorder: No problems reported or observed.  Panic Attack: Choking/swallowing difficulity, Other (Comment)  Post Traumatic Stress Disorder: No problems reported or observed. (Denies)  Delirium: Acute inattention, Change in speech (Pt was hard to redirect, pt centered on percevied eviction pressured speech.)  Review of Symptoms Comments: Please review above.     Past Psychiatric History/Meds/Treatments  Past Psychiatric History: Hx of unspecified depression, bipolar disorder, TANI, panic disorder with agoraphobia, and Alcohol use disorder. Provider identified as [facility]. Medication outlined as acetaminophen, cholecalciferol, gabapentin, hydroxyzine hcl, lamotrigine, melatonin, multivitamin, polyethylene glycol, sennosides-docusate sodium, sertraline/zoloft, tamsulosin per ED report 1/8/25. Last Psych Hospitalization was approximately 6-7 years ago, per pt report.  Past Psychiatric Meds/Treatments: Hx of unspecified depression, bipolar disorder, TANI, panic disorder with agoraphobia, and Alcohol use disorder. Provider identified as [facility]. Medication outlined as acetaminophen, cholecalciferol, gabapentin, hydroxyzine hcl, lamotrigine, melatonin, multivitamin, polyethylene glycol, sennosides-docusate sodium, sertraline/zoloft, tamsulosin per ED report  1/8/25. Last Psych Hospitalization was approximately 6-7 years ago, per pt report.  Past Violence/Victimization History: Unable to assess, pt was unable to be redirected.     Current Mental Health Contacts   Name/Phone Number: N/A   Last Appointment Date: N/A  Provider Name/Phone Number: [phone]  Provider Last Appointment Date: 01/08/24     Support System: Immediate family, Friends (Children and friends)     Living Arrangement: Lives alone     Home Safety  Feels Safe Living in Home: Yes  Potentially Unsafe Housing Conditions:  (Pt reports home to be safe however have concerns surrounding possible evictions and shut off notices of services.)  Home Safety : Please review above.     Income Information  Employment Status for: Patient  Employment Status: Unemployed, Other (Comment) (Recieves SSI)  Income Source: Social Security (reports 1400 monthly.)  Current/Previous Occupation: Unable to Assess  Income/Expense Information: Significantly in debt/bankrupt  Financial Concerns: Unable to Pay Bills  Who Manages Finances if Patient Unable: Pt manages own finances.  Employment/ Finance Comments: Please review above.     [place] Service/Education History  Current or Previous  Service: None (Denies.)   Experience:  (Pt denies.)  Education Level:  (Unable to assess, pt unable to be redirected.)  History of Learning Problems:  (Unable to assess, pt unable to be redirected.)  History of School Behavior Problems:  (Unable to assess, pt unable to be redirected.)  School History: Please review above.     Social/Cultural History  Social History: Pt has Tyshawn HAINES. Pt report finacial stressors, surround social determinants of health. Pt has a past history of hospitalizations, last one being approximately 6-7 years per pt report.  Cultural Requests During Hospitalization: Denies  Spiritual Requests During Hospitalization: Denies  Important Activities: Family, Social (Reports  having a reason to live surround friends and family interaction.)     Legal  Assistance with Managing/Advocating Healthcare Needs: Other (Comment) (YANCY Rasheed.)  Criminal Activity/ Legal Involvement Pertinent to Current Situation/ Hospitalization: Concerns surround finacial capabilities in sustaining housing.  Legal Concerns: Possible evictions per pt report.  Legal Comments: Possible evictions per pt report.     Drug Screening  Have you used any substances (canabis, cocaine, heroin, hallucinogens, inhalants, etc.) in the past 12 months?: No  Have you used any prescription drugs other than prescribed in the past 12 months?: No  Is a toxicology screen needed?: Yes        Behavioral Health  Parent/Guardian/Significant Other Involvement: Attentive to patient needs  Family Behaviors: Supportive, Cooperative, Appropriate for situation        General Appearance  Speech Pattern: Pressured, Rapid, Repetitive  General Attitude: Attentive, Cooperative, Interested  Appearance/Hygiene: Unable to assess (Only able to see pt from neck up.)     Thought Process  Coherency: [place] thinking  Content: Delusions  Delusions: Controlling  Perception: Not altered  Hallucination: None (denies)  Judgment/Insight: Limited  Confusion: None  Cognition: Appropriate judgement, Appropriate safety awareness, Appropriate attention/concentration     Sleep Pattern  Sleep Pattern:  (reports no concerns.)     Risk Factors  Self Harm/Suicidal Ideation Plan: Pt reports no SI/HI hx, however does disclose prior psych hospitalizations reporting last one being 6-7 years ago. Son report pt has had hospitalizations out of state.  Previous Self Harm/Suicidal Plans: Pt reports no SI/HI, or past SI/HI. Son reports some self harm, however is not able to provide means.  Risk Factors: Lower socioeconomic status, Major mental illness, Unemployment     Violence Risk Assessment  Assessment of Violence: None noted (pt medicated due to hitting self.)  Thoughts  of Harm to Others: No     Ability to Assess Risk Screen  Risk Screen - Ability to Assess: Able to be screened  Ask Suicide-Screening Questions  1. In the past few weeks, have you wished you were dead?: No  2. In the past few weeks, have you felt that you or your family would be better off if you were dead?: No  3. In the past week, have you been having thoughts about killing yourself?: No  4. Have you ever tried to kill yourself?: No  5. Are you having thoughts of killing yourself right now?: No  Calculated Risk Score: No intervention is necessary  [place] Suicide Severity Rating Scale (Screener/Recent Self-Report)  1. Wish to be Dead (Past 1 Month): No  2. Non-Specific Active Suicidal Thoughts (Past 1 Month): No  6. Suicidal Behavior (Lifetime): No  Calculated C-SSRS Risk Score (Lifetime/Recent): No Risk Indicated  Step 1: Risk Factors  Current & Past Psychiatric Dx: Mood disorder  Presenting Symptoms: Anxiety and/or panic  Family History: Other (Comment) (Unable to assess, pt not able to be redirected.)  Precipitants/Stressors: Other (Comment) (Finacial concerns.)  Change in Treatment: Hopeless or dissatisfied with provider or treatment (Pt unhappy with pink slip from outpatient provider.)  Access to Lethal Methods : No  Step 2: Protective Factors   Protective Factors Internal: Identifies reasons for living, Fear of death or the actual act of killing self  Protective Factors External: Supportive social network or family or friends, Positive therapeutic relationships  Step 3: Suicidal Ideation Intensity  Most Severe Suicidal Ideation Identified: Unable to assess, pt not able to be redirected.  How Many Times Have You Had These Thoughts: Less than once a week  When You Have the Thoughts How Long do They Last : Fleeting - few seconds or minutes  Could/Can You Stop Thinking About Killing Yourself or Wanting to Die if You Want to: Does not attempt to control thoughts  Are There Things - Anyone or Anything - That  "Stopped You From Wanting to Die or Acting on: Does not apply  What Sort of Reasons Did You Have For Thinking About Wanting to Die or Killing Yourself: Does not apply  Total Score: 2  Step 5: Documentation  Risk Level: Low suicide risk     Psychiatric Impression and Plan of Care  Specific Resources Provided to Patient: Upon assessment pt was observed agitated, cooperative and interative AOx4. Pt reports being seen in ED following meeting outpatient psychiatry and venting about financial stressors. Pt denies SI/HI, intent, plan, AVH/commands, and access to guns. Pt was PS by outpatient provider with report that pt is \"anxious distress, at risk of losing his house, not sleeping, not eating well, poor self-care, passive SI-no intention or plan.\" ED reports that ambulance observed fecies in pt home, from not cleaning behind animals. Pt report depressed moods, and decreased appetite with some weight loss. Energy level was described as low and decreased. Pt reports having a hx of panic attacks, and reports still occurring. Pt was ruminating over being evicted, son report that though this is concernt it is not imminent.  Son states that pt has not been eating, neighbor reported to son that pt is only consuming a bowl of oatmeal a day. Pt report being in compliant with medication however, psych report indicates pt has not been taking zyplexa.     Diagnosis:  TANI  CM Notified: Reports none.  PHP/IOP Recommended: N/A  Specific Information Provided for PHP/IOP: N/A  Plan Comments: Pt recommended for admission, per outpatient provider PS.     Outcome/Disposition  Patient's Perception of Outcome Achieved: Pt would just like to be discharged by monday for appt with senior living.  Assessment, Recommendations and Risk Level Reviewed with: Dean Irvin,  Contact Name: Tyshawn Rasheed  Contact Number(s): [phone]  Contact Relationship: Son/POA  EPAT Assessment Completed Date: 01/08/25  EPAT Assessment Completed Time: 1917 "                Electronically signed by FRANCISCO Rendon at 2025  7:18 PM         ED on 2025          Note shared with patient  Clinical Impressions      Housing insecurity    Anxiety    Passive suicidal ideations  Disposition      Transfer to Another Facility   Condition: Stable      AVS (Automatic SnapShot taken 2025)  Care Timeline         1225   Arrived   1321   alprazolam 0.25 mg   1336   Comprehensive Metabolic Panel      Magnesium     Phosphorus     Acute Toxicology Panel, Blood     TSH with reflex to Free T4 if abnormal     CBC and Auto Differential    1339   0.9 % sodium chloride 1000 mL   1352   XR chest 1 view   1411   CT head wo IV contrast   1415   Electrocardiogram, 12-lead   1600   Drug Screen, Urine     Urinalysis Microscopic      Urinalysis with Reflex Culture and Microscopic    1850   alprazolam 0.5 mg   1915   sertraline HCl 150 mg   191   lamotrigine 50 mg   214   trazodone HCl 100 mg        035   Discharged     End of EPAT Note).    This is a 74 year old  white male who was admitted for suicidal thoughts;  During assessment patient presented as very anxious, rubbing his hands together, rubbing his face and head constantly, twitching his fingers, admitting and agreeing he is very worried, stated he has an appointment on Monday with an Assisted Living facility, that his son arranged, for possible living/housing option.  He grew up with both parents, had an older brother, who is  ( unexpectedly at age 21 in MVA when patient aged 19, stated trauma for patient); has his Master's degree in Educational Psychology from [employer], grew up on [place], [place]; denied any abuse or neglect;  no alcohol use currently but used to drink, quit 3 years ago;  denies other alcohol and drugs; past DUI for Xanax intoxication 5 years ago;  recently purchased current house but he cannot afford to live there, bank is foreclosing in couple of months (hence his  "need to find housing);  He admitted he was diagnosed with \"Bipolar Illness\" \"years ago\";  he  his wife \"years ago\", has a grown son, living in [place], who is supportive of patient (ZANDRA on file);  Also signed an ZANDRA for his friend, Dr. Prashant Gillespie, who will pick him up at discharge; Sees psychiatry online through [facility]iatry;  Plan is to contact collateral, stabilize, and coordinate outpatient follow up care at discharge, to a safe and stable place. Sw to follow.   "

## 2025-01-09 NOTE — GROUP NOTE
"Group Topic: Dialectical Behavioral Therapy - Distress Tolerance   Group Date: 1/9/2025  Start Time: 1600  End Time: 1655  Facilitators: CRYSTAL Palafox   Department: [facility] REHAB THERAPY VIRTUAL    Number of Participants: 9   Group Focus: acceptance, coping skills, problem solving, and psychiatric education  Treatment Modality: Recreation Therapy  Interventions utilized were: DBT-DT-STOP and Turning the Mind Skills,  exploration, mental fitness, patient education, and support  Purpose: coping skills, maladaptive thinking, feelings, and insight or knowledge    Name: Ryan Rasheed YOB: 1950   MR: [id number]      Facilitator: Recreational Therapist  Level of Participation: did not attend  Progress: None  Comments: This group discussed the S.T.O.P. acronym which is stop, take a step back, observe, and proceed mindfully. Participants were encouraged to share how they may use this skill and when. Group also involved distress tolerance skills \"turning the mind\" and understanding the step by step process to work at acceptance verse rejection. Steps include: observation, making an inner commitment, practice/doing it again, and developing a plan.      Patient remained in their room throughout the session resting/sleeping.     Plan: continue with services      "

## 2025-01-09 NOTE — CONSULTS
Inpatient consult to Medicine  Consult performed by: SEBASTIAN Montenegro-CNP  Consult ordered by: Vanessa Lopez MD  Reason for consult: Medical Management          Reason For Consult  Medical Management     History Of Present Illness  Ryan Rasheed is a 74 y.o. male with a medical history of anxiety, panic disorder, and bipolar disorder, who presented to local ED for psychiatric evaluation due to suicidal ideations. He denied SI, AH, VH when interviewed in ED. In the ED, T. 36.2, , RR 16, /87, Pox 95%. Glucose 138, Sodium 139, Potassium 3.7, BUN 12, Creatinine 1.01. WBC 9.0, Hgb 17.0, Plts 251. TSH 2.27. CT Head unremarkable. Cleared by EPAT for psychiatric admission. Consulted for medical management.  Seen and examined in his room this afternoon. Cooperative and offers no new complaints. He denies chest pain, breathing difficulties, abdominal pain, N/V/D/C, fever, or chills.       Past Medical History  Anxiety  Depression  Panic Disorder with Agoraphobia  Bipolar Disorder  Osteoarthritis  Polymyalgia Rheumatica  Alcohol Dependence - Remote    Surgical History  Carpal Tunnel Release - Bilateral  Left Knee Meniscus Repair  Minimally invasive Laminectomy     Social History  He reports that he has quit smoking. His smoking use included cigarettes. He has been exposed to tobacco smoke. He has never used smokeless tobacco. Alcohol use questions deferred to the physician. He reports that he does not use drugs.    Family History  Mother - Hyperlipidemia  Father - Pancreatic Cancer     Allergies  Aspirin and Ibuprofen    Review of Systems  Constitutional: Denies fever, chills, fatigue, + weight/appetite loss  HEENT: Denies ear ache, sore throat, nasal drainage  Eyes: Denies blurred or double vision  Respiratory: Denies cough, shortness or breath, wheezing  Cardiovascular: Denies chest pain, palpitations, shortness of breath with exertion, edema  GI: Denies abdominal pain, nausea, vomiting, diarrhea,  constipation, bloody stools  : Denies urinary burning, urgency, frequency, hematuria  Musculoskeletal: Denies back, neck, or joint pain; joint redness, swelling, or tenderness  Endocrine: Denies cold/heat intolerance, excessive thirst, excessive hunger  Neuro: Denies dizziness, lightheadedness, seizures, headaches  Psychiatric: See HPI  Skin: Denies wounds, rashes, lesions  Hematologic: Denies easy bruising, easy bleeding, clotting disorder      Physical Exam  Constitutional: A&O x 3; NAD; calm and cooperative  Eyes: EOM's intact  HEENT: Normocephalic, Atraumatic. Oral mucosa moist. TD (mouth)  Neck: Supple. No JVD, lymphadenopathy.   Lungs: CTAB with fair air movement. Respirations even and unlabored on room air.   Heart: RRR  Abdomen: Soft, non-tender, non-distended, +BS  MS/Extremities: CROOK equally x 4. No edema. Peripheral pulses intact bilaterally.   Neuro: A&O x3; no focal deficits; gross motor and sensation intact. CN II-XII intact.   Skin: Warm and dry. No rashes or lesions  Psych: Normal affect.       Last Recorded Vitals  BP 99/63 (BP Location: Right arm, Patient Position: Standing)   Pulse 85   Temp 36.3 °C (97.3 °F) (Temporal)   Resp 16   Wt 66.1 kg (145 lb 11.6 oz)   SpO2 95%     Relevant Results  Results for orders placed or performed during the hospital encounter of 01/08/25 (from the past 96 hours)   CBC and Auto Differential   Result Value Ref Range    WBC 9.0 4.4 - 11.3 x10*3/uL    nRBC 0.0 0.0 - 0.0 /100 WBCs    RBC 5.91 (H) 4.50 - 5.90 x10*6/uL    Hemoglobin 17.0 13.5 - 17.5 g/dL    Hematocrit 49.6 41.0 - 52.0 %    MCV 84 80 - 100 fL    MCH 28.8 26.0 - 34.0 pg    MCHC 34.3 32.0 - 36.0 g/dL    RDW 14.2 11.5 - 14.5 %    Platelets 251 150 - 450 x10*3/uL    Neutrophils % 79.3 40.0 - 80.0 %    Immature Granulocytes %, Automated 0.4 0.0 - 0.9 %    Lymphocytes % 12.5 13.0 - 44.0 %    Monocytes % 7.1 2.0 - 10.0 %    Eosinophils % 0.1 0.0 - 6.0 %    Basophils % 0.6 0.0 - 2.0 %    Neutrophils  Absolute 7.12 (H) 1.60 - 5.50 x10*3/uL    Immature Granulocytes Absolute, Automated 0.04 0.00 - 0.50 x10*3/uL    Lymphocytes Absolute 1.12 0.80 - 3.00 x10*3/uL    Monocytes Absolute 0.64 0.05 - 0.80 x10*3/uL    Eosinophils Absolute 0.01 0.00 - 0.40 x10*3/uL    Basophils Absolute 0.05 0.00 - 0.10 x10*3/uL   Comprehensive Metabolic Panel   Result Value Ref Range    Glucose 138 (H) 74 - 99 mg/dL    Sodium 139 136 - 145 mmol/L    Potassium 3.7 3.5 - 5.3 mmol/L    Chloride 98 98 - 107 mmol/L    Bicarbonate 26 21 - 32 mmol/L    Anion Gap 19 10 - 20 mmol/L    Urea Nitrogen 12 6 - 23 mg/dL    Creatinine 1.01 0.50 - 1.30 mg/dL    eGFR 79 >60 mL/min/1.73m*2    Calcium 10.0 8.6 - 10.3 mg/dL    Albumin 5.1 (H) 3.4 - 5.0 g/dL    Alkaline Phosphatase 110 33 - 136 U/L    Total Protein 8.5 (H) 6.4 - 8.2 g/dL    AST 27 9 - 39 U/L    Bilirubin, Total 1.0 0.0 - 1.2 mg/dL    ALT 37 10 - 52 U/L   Acute Toxicology Panel, Blood   Result Value Ref Range    Acetaminophen <10.0 10.0 - 30.0 ug/mL    Salicylate  <3 4 - 20 mg/dL    Alcohol <10 <=10 mg/dL   TSH with reflex to Free T4 if abnormal   Result Value Ref Range    Thyroid Stimulating Hormone 2.27 0.44 - 3.98 mIU/L   Magnesium   Result Value Ref Range    Magnesium 2.01 1.60 - 2.40 mg/dL   Phosphorus   Result Value Ref Range    Phosphorus 4.0 2.5 - 4.9 mg/dL   Red Top   Result Value Ref Range    Extra Tube Hold for add-ons.    Electrocardiogram, 12-lead   Result Value Ref Range    Ventricular Rate 97 BPM    Atrial Rate 97 BPM    OK Interval 138 ms    QRS Duration 88 ms    QT Interval 354 ms    QTC Calculation(Bazett) 449 ms    P Axis 63 degrees    R Axis 55 degrees    T Axis 55 degrees    QRS Count 16 beats    Q Onset 214 ms    P Onset 145 ms    P Offset 199 ms    T Offset 391 ms    QTC Fredericia 415 ms   Drug Screen, Urine   Result Value Ref Range    Amphetamine Screen, Urine Presumptive Negative Presumptive Negative    Barbiturate Screen, Urine Presumptive Negative Presumptive Negative     Benzodiazepines Screen, Urine Presumptive Negative Presumptive Negative    Cannabinoid Screen, Urine Presumptive Negative Presumptive Negative    Cocaine Metabolite Screen, Urine Presumptive Negative Presumptive Negative    Fentanyl Screen, Urine Presumptive Negative Presumptive Negative    Opiate Screen, Urine Presumptive Negative Presumptive Negative    Oxycodone Screen, Urine Presumptive Negative Presumptive Negative    PCP Screen, Urine Presumptive Negative Presumptive Negative    Methadone Screen, Urine Presumptive Negative Presumptive Negative   Urinalysis with Reflex Culture and Microscopic   Result Value Ref Range    Color, Urine Yellow Light-Yellow, Yellow, Dark-Yellow    Appearance, Urine Clear Clear    Specific Gravity, Urine 1.029 1.005 - 1.035    pH, Urine 5.5 5.0, 5.5, 6.0, 6.5, 7.0, 7.5, 8.0    Protein, Urine 50 (1+) (A) NEGATIVE, 10 (TRACE), 20 (TRACE) mg/dL    Glucose, Urine Normal Normal mg/dL    Blood, Urine NEGATIVE NEGATIVE    Ketones, Urine 40 (2+) (A) NEGATIVE mg/dL    Bilirubin, Urine 0.5 (1+) (A) NEGATIVE    Urobilinogen, Urine 4 (2+) (A) Normal mg/dL    Nitrite, Urine NEGATIVE NEGATIVE    Leukocyte Esterase, Urine NEGATIVE NEGATIVE   Extra Urine Gray Tube   Result Value Ref Range    Extra Tube Hold for add-ons.    Urinalysis Microscopic   Result Value Ref Range    WBC, Urine 6-10 (A) 1-5, NONE /HPF    RBC, Urine 3-5 NONE, 1-2, 3-5 /HPF    Mucus, Urine 4+ Reference range not established. /LPF    Hyaline Casts, Urine 3+ (A) NONE /LPF      Scheduled medications  [Held by provider] cholecalciferol, 5,000 Units, oral, Daily  [Held by provider] gabapentin, 100 mg, oral, Nightly  lamoTRIgine, 50 mg, oral, BID  polyethylene glycol, 17 g, oral, Daily  sennosides-docusate sodium, 2 tablet, oral, BID  sertraline, 150 mg, oral, Daily  [Held by provider] tamsulosin, 0.4 mg, oral, Daily      Continuous medications     PRN medications  PRN medications: acetaminophen, acetaminophen, alum-mag  hydroxide-simeth, diphenhydrAMINE **OR** diphenhydrAMINE, hydrOXYzine HCL, melatonin, QUEtiapine **OR** OLANZapine, traZODone     CT Head:   Impression:     No acute intracranial pathology.      CXR:   Impression:     No acute cardiopulmonary disease.     Assessment/Plan   74 y.o. male with a medical history of anxiety, panic disorder, and bipolar disorder, who presented to local ED for psychiatric evaluation due to suicidal ideations. In the ED, cleared by EPAT for psychiatric admission. Consulted for medical management.    Elevated BP w/o Diagnosis of HTN  -Suspect situational related BP as mildly elevated in ED  -TSH WNL  -Will monitor and add agent if SBP persists >140    Mood Disorder with Suicide Ideations  -H/O bipolar, anxiety, depression, and panic disorders  -Under the care of Dr. Lopez and psychiatric team for management    Polymyalgia Rheumatica/OA  -On gabapentin    Disposition  -Plan of of care per psychiatry.  -> 60 minutes spent in coordination of care, including physical examination, review of chart, and discussion with pertinent staff.      Thank you for the consult. Please call/message via secure chat with medical questions.     Siria Lawler, SEBASTIAN-CNP

## 2025-01-09 NOTE — CARE PLAN
"The patient's goals for the shift include Safety, Education    The clinical goals for the shift include \"Find out when I will get to leave\"    Over the shift, the patient did not make progress toward the following goals. Barriers to progression include acuity of illness. Recommendations to address these barriers include medication compliance, safety, and education.    "

## 2025-01-09 NOTE — CARE PLAN
The patient's goals for the shift include pt did not state goal    The clinical goals for the shift include maintain safety    Over the shift, the patient did not make progress toward the following goals.       Problem: Fall/Injury  Goal: Not fall by end of shift  Outcome: Progressing  Goal: Be free from injury by end of the shift  Outcome: Progressing     Problem: Sensory Perceptual Alteration as Evidenced by  Goal: Cooperates with admission process  Outcome: Progressing  Goal: Patient/Family participate in treatment and discharge plans  Outcome: Progressing  Goal: Patient/Family verbalizes awareness of resources  Outcome: Progressing  Goal: Participates in unit activities  Outcome: Progressing  Goal: Discusses signs/symptoms of illness/treatment options  Outcome: Progressing  Goal: Initiates reality-based interactions  Outcome: Progressing  Goal: Able to discuss content of hallucinations/delusions  Outcome: Progressing     Problem: Potential for Harm to Self or Others  Goal: Participates in unit activities  Outcome: Progressing  Goal: Patient/Family participate in treatment and discharge plans  Outcome: Progressing  Goal: Identifies deescalation techniques  Outcome: Progressing  Goal: Understands least restrictive measures  Outcome: Progressing  Goal: Identifies stressors that lead to harmful behaviors  Outcome: Progressing  Goal: Notifies staff when experiencing harmful thoughts toward self/others  Outcome: Progressing  Goal: Denies harm toward self or others  Outcome: Progressing  Goal: Free from restraint events  Outcome: Progressing     Problem: Ineffective Coping  Goal: Identifies ineffective coping skills  Outcome: Progressing  Goal: Identifies healthy coping skills  Outcome: Progressing  Goal: Demonstrates healthy coping skills  Outcome: Progressing     Problem: Alteration in Sleep  Goal: STG - Reports nightly sleep, duration, and quality  Outcome: Progressing  Goal: STG - Identifies sleep hygiene  aids  Outcome: Progressing  Goal: STG - Informs staff if unable to sleep  Outcome: Progressing     Problem: Anxiety  Goal: Attempts to manage anxiety with help  Outcome: Progressing  Goal: Verbalizes ways to manage anxiety  Outcome: Progressing  Goal: Implements measures to reduce anxiety  Outcome: Progressing     Problem: Self Care Deficit  Goal: STG - Patient completes hygiene  Outcome: Progressing  Goal: Increase group attendance  Outcome: Progressing  Goal: Accepts need for medications  Outcome: Progressing     Problem: Defensive Coping  Goal: Identifies reckless/dangerous behavior  Outcome: Progressing  Goal: Identifies stressors that lead to reckless/dangerous behavior  Outcome: Progressing  Goal: Discusses and identifies healthy coping skills  Outcome: Progressing  Goal: Demonstrates healthy coping skills  Outcome: Progressing     Problem: Pain  Goal: Takes deep breaths with improved pain control throughout the shift  Outcome: Progressing  Goal: Turns in bed with improved pain control throughout the shift  Outcome: Progressing  Goal: Walks with improved pain control throughout the shift  Outcome: Progressing  Goal: Performs ADL's with improved pain control throughout shift  Outcome: Progressing  Goal: Participates in PT with improved pain control throughout the shift  Outcome: Progressing

## 2025-01-09 NOTE — SIGNIFICANT EVENT
Application for Emergency Admission      Ready for Transfer?  Is the patient medically cleared for transfer to inpatient psychiatry: Yes  Has the patient been accepted to an inpatient psychiatric hospital: Yes    Application for Emergency Admission  IN ACCORDANCE WITH [place] 5122[place]10 [place]  The Chief Clinical Officer of: [facility] 1/8/2025 .11:06 PM    Reason for Hospitalization  The undersigned has reason to believe that: Ryan Rasheed Is a mentally ill person subject to hospitalization by court order under division B Section 5122.01 of the Revised Code, i.e., this person:    1.Yes  Represents a substantial risk of physical harm to self as manifested by evidence of threats of, or attempts at, suicide or serious self-inflicted bodily harm    2.No Represents a substantial risk of physical harm to others as manifested by evidence of recent homicidal or other violent behavior, evidence of recent threats that place another in reasonable fear of violent behavior and serious physical harm, or other evidence of present dangerousness    3.Yes Represents a substantial and immediate risk of serious physical impairment or injury to self as manifested by  evidence that the person is unable to provide for and is not providing for the person's basic physical needs because of the person's mental illness and that appropriate provision for those needs cannot be made  immediately available in the community    4.Yes Would benefit from treatment in a hospital for his mental illness and is in need of such treatment as manifested by evidence of behavior that creates a grave and imminent risk to substantial rights of others or  himself.    5.No Would benefit from treatment as manifested by evidence of behavior that indicates all of the following:       (a) The person is unlikely to survive safely in the community without supervision, based on a clinical determination.       (b) The person has a history of lack of  compliance with treatment for mental illness and one of the following applies:      (i) At least twice within the thirty-six months prior to the filing of an affidavit seeking court-ordered treatment of the person under section 5122.111 of the Revised Code, the lack of compliance has been a significant factor in necessitating hospitalization in a hospital or receipt of services in a forensic or other mental health unit of a correctional facility, provided that the thirty-six-month period shall be extended by the length of any hospitalization or incarceration of the person that occurred within the thirty-six-month period.      (ii) Within the forty-eight months prior to the filing of an affidavit seeking court-ordered treatment of the person under section 5122.111 of the Revised Code, the lack of compliance resulted in one or more acts of serious violent behavior toward self or others or threats of, or attempts at, serious physical harm to self or others, provided that the forty-eight-month period shall be extended by the length of any hospitalization or incarceration of the person that occurred within the forty-eight-month period.      (c) The person, as a result of mental illness, is unlikely to voluntarily participate in necessary treatment.       (d) In view of the person's treatment history and current behavior, the person is in need of treatment in order to prevent a relapse or deterioration that would be likely to result in substantial risk of serious harm to the person or others.    (e) Represents a substantial risk of physical harm to self or others if allowed to remain at liberty pending examination.    Therefore, it is requested that said person be admitted to the above named facility.    STATEMENT OF BELIEF    Must be filled out by one of the following: a psychiatrist, licensed physician, licensed clinical psychologist, health or ,  or .  (Statement shall include the  circumstances under which the individual was taken into custody and the reason for the person's belief that hospitalization is necessary. The statement shall also include a reference to efforts made to secure the individual's property at his residence if he was taken into custody there. Every reasonable and appropriate effort should be made to take this person into custody in the least conspicuous manner possible.)    Patient saw their mental health provider today who pink slipped the patient to have him placed.  The family also had reported concerns about the patient's wellbeing.    Kike Hobson,  1/8/2025     _____________________________________________________________   Place of Employment: [employer] [facility] [place]    STATEMENT OF OBSERVATION BY PSYCHIATRIST, LICENSED PHYSICIAN, OR LICENSED CLINICAL PSYCHOLOGIST, IF APPLICABLE    Place of Observation (e.g., [facility] center, general hospital, office, emergency facility)  (If applicable, please complete)    Kike Hobson DO 1/8/2025    _____________________________________________________________

## 2025-01-09 NOTE — PROGRESS NOTES
"Occupational Therapy     REHAB Therapy Assessment & Treatment    Patient Name: Ryan Rasheed  MRN: [id number]  Today's Date: 1/9/2025      Activity Assessment:     Self-Check In/ Mental Health Recovery Group: [phone]  [place] Setting and Importance Group: [id number]  Pet Therapy as a Coping Tool Group: [id number]    3/3 Groups attended       Following OT eval and in collaboration with OTR/L, pt presents to first group appearing confused, hesitant and having difficulty adjusting from conversation with staff and engaging in group. Pt required mod encouragement to sit/down and join group. Pt then observed with lip smacking and constantly rubbing his chin however, did appear attentive. Pt does not conversant during self check in however, when provided education on boundary setting group pt able to share needed boundaries without hesitation stating confidently \"I am not a loser, I am okay, I do not want bad things, I can control myself, I will get better, and I cannot control other people\" During pet therapy, pt continues to demo interest in engaging with the therapy dog and is able to do this and then removes self following interaction. Pt with gradual progress toward OT goals as evidenced above. Pt would benefit from continued OT services in order to improve overall self-esteem, personal confidence and positive supports for safe transition at discharge.      Encounter Problems       Encounter Problems (Active)       OT Goals       Pt will demo increased activity level by attending 8-10 groups per week.        Start:  01/09/25    Expected End:  01/30/25            Pt will explore and ID 1-2 strategies to manage stressors/symptoms of illness/ grief more effectively prior to discharge.        Start:  01/09/25    Expected End:  01/30/25            Pt will ID/ utilize 1-2 ways to increase balance of activity/ re-involve self in functional daily routines/roles prior to discharge.        Start:  01/09/25    Expected End:  " 01/30/25            Pt will ID 2-3 effective ways to distract from crisis and ID stressors/ personal symptoms of stress in order to improve management of stress during daily activities.        Start:  01/09/25    Expected End:  01/30/25            Pt will ID 2 community resources/programs to join/attend after D/C to improve their support system       Start:  01/09/25    Expected End:  01/30/25                         Additional Comments:    SALINAS collaborated with patients nurse and charge nurse throughout the day to provide appropriate support and encouragement to attend groups. Pt up on unit when SALINAS left last group of the day. All needs met.
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
## 2025-01-09 NOTE — HOSPITAL COURSE
DIAGNOSIS:      1/22/24  social History  Overall Financial Resource Strain (CARDIA) Answer Date Recorded  How hard is it for you to pay for the very basics like food, housing, medical care, and heating? Not hard at all 01/22/2024  Social History  PHQ-2 Answer Date Recorded  PHQ-2 score 5 04/26/2024  Social History  [employer] - Occupational Stress Questionnaire Answer Date Recorded  Do you feel stress - tense, restless, nervous, or anxious, or unable to sleep at night because your mind is troubled all the time - these days?  Housing Stability Vital Sign Answer Date Recorded  In the last 12 months, was there a time when you were not able to pay the mortgage or rent on time? Patient refused 01/22/2024    SUBJECTIVE: Depression and panic attacks (past month).  Lack of daily activity and goals.  Son and daughter-in-law's move out of country (feeling of abandonment).  Unable to travel to see them (legal restrictions; temporary visitation?).G    generalized Anxiety Disorder Scale (TANI-7)    2/2/2024 2/28/2024 4/26/2024  TANI - 7 SCORES  Score 18  18 7 12  (0-4) minimal anxiety, (5-9) mild anxiety, (10-14) moderate anxiety, (15-21) severe anxiety    2/2/2024 2/28/2024 4/26/2024  PHQ-9  Score 14  14 5 14  (0-4) minimal depression, (5-9) mild depression, (10-14) moderate depression, (15-19) moderately severe depression, (20-27) severe depression    NEUROLOGY-[facility] FERNANDO MCGRAW DO [facility]  PSYCHOLOGY-[facility]     --------------  [id number] every day  7/23/23  Latuda 25mg am 8/28/23   Lamictal 2/29/24  Med hx: vistaril 25 mg tid prn 2/13/24  Remeron 30 mg 5/3/233  Ativan 1 every day 11/16/23  Lithium 300 mg every day  4/13/22  Zoloft 100 mg am 3/15/23  Seroquel 200 mg at bedtime 3/14/23    Benzo, klonipin, gabapentin, ambien, ativan per Maria Isabel amezcuagodinski DO [facility]

## 2025-01-09 NOTE — H&P
"Physician Certification & Recertification:  Certification/Re-Certification: INITIAL   I certify that the inpatient psychiatric hospital admission is medically necessary for:  treatment which could reasonably be expected to improve the patient's condition that could not be provided in a less restrictive setting   I estimate the period of hospitalization are necessary for treatment of this patient will be:  8-14 days    My plans for post hospital care for this patient are:  home        Admission Reason:                 The reason for admission includes: passive suicidal ideations which patient denied he never made the statment chronic anxiety, depression worse, and financial problems.  Onset of symptoms was gradual starting 6 months ago with gradually worsening course since that time. Psychosocial Stressors: financial, housing.    Ryan Rasheed is a 74 year old male who presented to [facility] ED via ambulance from outpatient psychiatrist visit. Patient \"does not know why I am here\". He was told that the psychiatrist told EMS that he had made suicidal statements in the session but he denies making any such statements. Ryan currently denies SI, and states those thoughts have \"never crossed his mind\". He has no past history of SA.     Patient states that his anxiety and depression are both 10/10 in severity. During the interview he reports that his only psychiatric illness is Bipolar disorder, although his EMR demonstrates additional history of Panic Disorder, agoraphobia, TANI, and alcohol abuse (in remission). His primary source of anxiety is financial and states that he will be evicted from his house in 3-6 months as he can no longer afford to make mortgage payments. He is not currently employed, states that he lives off of savings and [employer]. He \"thought there was much more in the bank than there actually is\".     He has one son who lives in [place]. He infrequently communicates with his son but states they have a good " "relationship. Patient states he is able to properly care for himself and perform all ADLs and IADLs independently. However, patient has had increase in anxiety and depression over past 6 months primarily related to financial difficulties. Reports he only sleeps 2-3 hours a night, has frequent panic attacks, and near constant worry. Has low appetite. Denies feelings of guilt, hopelessness, or worthlessness. Endorses some anhedonia, used to enjoy cycling but has not gone recently.     Patient sees a outpatient psychiatrist every other week for medication management for Bipolar disorder. He is currently taking Lamictal but feels \"the medication doesn't help my mood at all\". Denies episodes of katy. He has received psychotherapy in the past, but has quit and denies interest in restarting. Denies ETOH and substance use. Remote legal history of DUI.     PAST PSYCHIATRIC HISTORY/ PAST PSYCHIATRIC MEDICATION HISTORY:  Previous therapy: yes  Previous psychiatric treatment and medication trials: yes - Reports being treated with Lamictal for Bipolar disorder with poor efficacy.  Was weaned off Seroquel in May 2024 2/2 neuroleptic induced Parkinsonism  Previous psychiatric hospitalizations: no -- denies inpatient psych admission, has had several ED visit in past year for anxiety but has eloped.   Previous diagnoses: yes - Bipolar depression, Panic disorder, Agoraphobia, TANI, ETOH abuse (in remission)  Previous suicide attempts: no  History of violence: no  Currently in treatment with Lamictal for Bipolar disorder.  Depression screening was performed with standardized tool: Yes - Depression    SOCIAL HISTORY:/LEGAL HISTORY:  - Lives alone   - Has one son who lives in [place]   - No pets in the home  -  -prior music degree, bio-feedback practice in [place]  - on disability for medical/mental health  FAMILY HISTORY:  denied    SUBSTANCE USE HISTORY:  -ETOH abuse, in remission  - Denies other substances  Former smoker  History " of xanax intoxication, dui charges, past    TRAUMA HISTORY:  denied    -------------------------------  Outpatient medication, per epic:  Current Outpatient Medications   Medication Instructions    acetaminophen (TYLENOL) 650 mg, oral, Every 4 hours PRN    gabapentin (NEURONTIN) 100 mg, oral, Nightly    lamoTRIgine (LAMICTAL) 50 mg, oral, 2 times daily    melatonin 3 mg, oral, Nightly PRN    multivitamin with folic acid (One Daily Multivitamin) 400 mcg tablet 1 tablet, oral, Daily    polyethylene glycol (GLYCOLAX, MIRALAX) 17 g, oral, Daily    sennosides-docusate sodium (Bonnie-Colace) 8.6-50 mg tablet 2 tablets, oral, 2 times daily    sertraline (ZOLOFT) 100 mg, oral, Daily, 150mg total    sertraline (ZOLOFT) 50 mg, oral, Daily, Total 150mg     tamsulosin (FLOMAX) 0.4 mg, oral, Daily    traZODone (DESYREL) 100-200 mg, oral, Nightly PRN       Medication Inpatient during this admission, ordered, continued from outpatient:    Scheduled medications  [Held by provider] cholecalciferol, 5,000 Units, oral, Daily  [Held by provider] gabapentin, 100 mg, oral, Nightly  lamoTRIgine, 50 mg, oral, BID  polyethylene glycol, 17 g, oral, Daily  sennosides-docusate sodium, 2 tablet, oral, BID  sertraline, 150 mg, oral, Daily  [Held by provider] tamsulosin, 0.4 mg, oral, Daily      Continuous medications     PRN medications  PRN medications: acetaminophen, acetaminophen, alum-mag hydroxide-simeth, diphenhydrAMINE **OR** diphenhydrAMINE, hydrOXYzine HCL, melatonin, QUEtiapine **OR** OLANZapine, traZODone  PRN medications: acetaminophen, acetaminophen, alum-mag hydroxide-simeth, diphenhydrAMINE **OR** diphenhydrAMINE, hydrOXYzine HCL, melatonin, QUEtiapine **OR** OLANZapine, traZODone    ALLERGIES:  Allergies   Allergen Reactions    Aspirin Other     Unable to tolerate due to hx of colitis. No allergy.        Ibuprofen Other     Hx of crohns and colitis        ----------------------  Electrocardiogram, 12-lead    Result Date:  1/9/2025  Normal sinus rhythm Normal ECG When compared with ECG of 03-MAY-2024 10:42, T wave amplitude has decreased in Lateral leads    CT head wo IV contrast    Result Date: 1/8/2025  Interpreted By:  Hans Villasenor, STUDY: CT HEAD WO IV CONTRAST  1/8/2025 2:11 pm   INDICATION: Signs/Symptoms:AMS   COMPARISON: None.   ACCESSION NUMBER(S): [id number]   ORDERING CLINICIAN: FELIPA CONRAD   TECHNIQUE: Contiguous axial CT images of the brain were obtained without IV contrast.   FINDINGS: The ventricles, cisterns and sulci are prominent, consistent with moderate diffuse volume loss. Areas of white matter low attenuation are nonspecific but likely related to chronic microvascular disease. There is intracranial atherosclerosis.   Gray-white differentiation is preserved. No acute intracranial hemorrhage or mass effect. No midline shift. Patent basal cisterns. No extraaxial fluid collections.   The calvaria is intact. The visualized paranasal sinuses and mastoid air cells are clear.       No acute intracranial pathology.     Signed by: Hans Villasenor 1/8/2025 2:23 PM Dictation workstation:   [id number]    XR chest 1 view    Result Date: 1/8/2025  Interpreted By:  Jessica Maier, STUDY: XR CHEST 1 VIEW 1/8/2025 1:52 pm   INDICATION: Signs/Symptoms:Failure to thrive   COMPARISON: 05/03/2024   ACCESSION NUMBER(S): [id number]   ORDERING CLINICIAN: FELIPA CONRAD   TECHNIQUE: AP erect view of the chest at bedside   FINDINGS: The cardiac size is normal. No mediastinal or hilar abnormality is seen. The lungs are clear. No pleural abnormality is observed.       No acute cardiopulmonary disease.   Signed by: Jessica Maier 1/8/2025 2:14 PM Dictation workstation:   [id number]         Psychiatric ROS - Adult  Anxiety: reports experiencing excessive worries about financial issues that he can't control, and result in problems sleeping, decreased energy, irritability, and restlessness. Frequent panic attacks  Depression: anhedonia, appetite  "decreased, and sleep decreased   Delirium: negative  Psychosis: negative  Deysi: negative  Safety Issues: none      REVIEW OF SYSTEMS:  Review of Systems       1/8/2025    12:30 PM 1/8/2025     2:21 PM 1/8/2025     4:15 PM 1/8/2025     9:40 PM 1/9/2025     3:51 AM 1/9/2025     5:30 AM 1/9/2025     5:31 AM   Vitals   Systolic 141 131 132 119 123 103 99   Diastolic 87 75 83 85 64 66 63   BP Location Right arm Right arm    Right arm Right arm   Heart Rate 118 71 110 89 80 87 85   Temp 36.2 °C (97.2 °F)   36.5 °C (97.7 °F)  36.3 °C (97.3 °F)    Resp 16 18 18 18 20 16 16   Height 1.702 m (5' 7\")     1.727 m (5' 8\")    Weight (lb) 200     145.72    BMI 31.32 kg/m2     22.16 kg/m2    BSA (m2) 2.07 m2     1.78 m2      Daily Weight  01/09/25 : 66.1 kg (145 lb 11.6 oz)    Results for orders placed or performed during the hospital encounter of 01/08/25 (from the past 96 hours)   CBC and Auto Differential   Result Value Ref Range    WBC 9.0 4.4 - 11.3 x10*3/uL    nRBC 0.0 0.0 - 0.0 /100 WBCs    RBC 5.91 (H) 4.50 - 5.90 x10*6/uL    Hemoglobin 17.0 13.5 - 17.5 g/dL    Hematocrit 49.6 41.0 - 52.0 %    MCV 84 80 - 100 fL    MCH 28.8 26.0 - 34.0 pg    MCHC 34.3 32.0 - 36.0 g/dL    RDW 14.2 11.5 - 14.5 %    Platelets 251 150 - 450 x10*3/uL    Neutrophils % 79.3 40.0 - 80.0 %    Immature Granulocytes %, Automated 0.4 0.0 - 0.9 %    Lymphocytes % 12.5 13.0 - 44.0 %    Monocytes % 7.1 2.0 - 10.0 %    Eosinophils % 0.1 0.0 - 6.0 %    Basophils % 0.6 0.0 - 2.0 %    Neutrophils Absolute 7.12 (H) 1.60 - 5.50 x10*3/uL    Immature Granulocytes Absolute, Automated 0.04 0.00 - 0.50 x10*3/uL    Lymphocytes Absolute 1.12 0.80 - 3.00 x10*3/uL    Monocytes Absolute 0.64 0.05 - 0.80 x10*3/uL    Eosinophils Absolute 0.01 0.00 - 0.40 x10*3/uL    Basophils Absolute 0.05 0.00 - 0.10 x10*3/uL   Comprehensive Metabolic Panel   Result Value Ref Range    Glucose 138 (H) 74 - 99 mg/dL    Sodium 139 136 - 145 mmol/L    Potassium 3.7 3.5 - 5.3 mmol/L    " Chloride 98 98 - 107 mmol/L    Bicarbonate 26 21 - 32 mmol/L    Anion Gap 19 10 - 20 mmol/L    Urea Nitrogen 12 6 - 23 mg/dL    Creatinine 1.01 0.50 - 1.30 mg/dL    eGFR 79 >60 mL/min/1.73m*2    Calcium 10.0 8.6 - 10.3 mg/dL    Albumin 5.1 (H) 3.4 - 5.0 g/dL    Alkaline Phosphatase 110 33 - 136 U/L    Total Protein 8.5 (H) 6.4 - 8.2 g/dL    AST 27 9 - 39 U/L    Bilirubin, Total 1.0 0.0 - 1.2 mg/dL    ALT 37 10 - 52 U/L   Acute Toxicology Panel, Blood   Result Value Ref Range    Acetaminophen <10.0 10.0 - 30.0 ug/mL    Salicylate  <3 4 - 20 mg/dL    Alcohol <10 <=10 mg/dL   TSH with reflex to Free T4 if abnormal   Result Value Ref Range    Thyroid Stimulating Hormone 2.27 0.44 - 3.98 mIU/L   Magnesium   Result Value Ref Range    Magnesium 2.01 1.60 - 2.40 mg/dL   Phosphorus   Result Value Ref Range    Phosphorus 4.0 2.5 - 4.9 mg/dL   Red Top   Result Value Ref Range    Extra Tube Hold for add-ons.    Electrocardiogram, 12-lead   Result Value Ref Range    Ventricular Rate 97 BPM    Atrial Rate 97 BPM    NC Interval 138 ms    QRS Duration 88 ms    QT Interval 354 ms    QTC Calculation(Bazett) 449 ms    P Axis 63 degrees    R Axis 55 degrees    T Axis 55 degrees    QRS Count 16 beats    Q Onset 214 ms    P Onset 145 ms    P Offset 199 ms    T Offset 391 ms    QTC Fredericia 415 ms   Drug Screen, Urine   Result Value Ref Range    Amphetamine Screen, Urine Presumptive Negative Presumptive Negative    Barbiturate Screen, Urine Presumptive Negative Presumptive Negative    Benzodiazepines Screen, Urine Presumptive Negative Presumptive Negative    Cannabinoid Screen, Urine Presumptive Negative Presumptive Negative    Cocaine Metabolite Screen, Urine Presumptive Negative Presumptive Negative    Fentanyl Screen, Urine Presumptive Negative Presumptive Negative    Opiate Screen, Urine Presumptive Negative Presumptive Negative    Oxycodone Screen, Urine Presumptive Negative Presumptive Negative    PCP Screen, Urine Presumptive  Negative Presumptive Negative    Methadone Screen, Urine Presumptive Negative Presumptive Negative   Urinalysis with Reflex Culture and Microscopic   Result Value Ref Range    Color, Urine Yellow Light-Yellow, Yellow, Dark-Yellow    Appearance, Urine Clear Clear    Specific Gravity, Urine 1.029 1.005 - 1.035    pH, Urine 5.5 5.0, 5.5, 6.0, 6.5, 7.0, 7.5, 8.0    Protein, Urine 50 (1+) (A) NEGATIVE, 10 (TRACE), 20 (TRACE) mg/dL    Glucose, Urine Normal Normal mg/dL    Blood, Urine NEGATIVE NEGATIVE    Ketones, Urine 40 (2+) (A) NEGATIVE mg/dL    Bilirubin, Urine 0.5 (1+) (A) NEGATIVE    Urobilinogen, Urine 4 (2+) (A) Normal mg/dL    Nitrite, Urine NEGATIVE NEGATIVE    Leukocyte Esterase, Urine NEGATIVE NEGATIVE   Extra Urine Gray Tube   Result Value Ref Range    Extra Tube Hold for add-ons.    Urinalysis Microscopic   Result Value Ref Range    WBC, Urine 6-10 (A) 1-5, NONE /HPF    RBC, Urine 3-5 NONE, 1-2, 3-5 /HPF    Mucus, Urine 4+ Reference range not established. /LPF    Hyaline Casts, Urine 3+ (A) NONE /LPF   Abnormal Involuntary Movement Scale (AIMS)    Note: ratings for first three major categories. 0 = none, 1 = minimal (or be extreme normal), 2 = mild, 3 = moderate, and 4 = severe.      A) Facial and Oral Movement       1) Muscles of facial expression (e.g., movements of forehead, eyebrows, periorbital area, cheeks, include frowning, blinking, grimacing of upper face)       Rating = 0         2) Lips and perioral area (e.g., puckering, pouting, smacking)       Rating = 3         3) Jaw (e.g., biting, clenching, chewing, mouth opening, lateral movement)       Rating = 3         4) Tongue (rate only increase in movements both in and out of mouth, not inability to sustain movement)       Rating = 2    B) Extremity Movements       5) Upper (arms, wrist, hands, fingers). Include movements that are choreic (rapid, objectively purposeless, irregular, spontaneous) or athetoid (slow, irregular, complex, serpentine). Do  not include            tremor (repetitive, regular, rhythmic movements).       Rating = 2         6) Lower (legs, knees, ankles, toes). (E.g., lateral knee movement, foot tapping, heel, dropping, foot squirming, inversion and eversion of foot).       Rating = 0    C) Trunk Movements       7) Neck, shoulders, hips (e.g., rocking, twisting, squirming, pelvic gyrations, and include diaphragmatic movements)       Rating = 0    D) Global Judgments       8) Severity of abnormal movements (based on highest single score of the above items).       Rating = 3         9) Incapacitation due to abnormal movements       Rating = 1         10) Patient's awareness of abnormal movements       Rating = 2    E) Dental Status       11) Current problems with teeth and/or dentures       1-point YES         12) Does patient usually wear dentures       1-point YES     MSE:                                                                                                             General: C/f suicidal ideation  Appearance: Appears stated age, dressed in hospital attire, appropriate grooming and hygiene  Attitude:  Calm, cooperative  Behavior:  Appropriate eye contact, fidgity at baseline  Movement: Near constant lip smacking movements . Unsteady gait. Normal muscle tone/bulk.  Speech and language: Regular rate, rhythm, volume and tone, spontaneous, fluent, not pressured  Mood: anxious, due to upcoming concern of home loss, housing apt with AL   Affect:  Euthymic, full range, affect/mood congruent  Thought process:  linear, organized, logical Linear, goal directed  Thought content:  Does not endorse suicidal ideation or homicidal ideations, no delusions elicited.  Perception: Does not endorse auditory/visual hallucinations. Does not appear to be responding to hallucinatory stimuli. No perceptual abnormalities noted  Cognition:  alert and oriented x 4, short and long term memory grossly intact,  attention and concentration grossly intact    Insight:  Fair, as patient recognizes symptoms of illness and need for recommended treatments.   Judgment: Can make reasonable decisions about ordinary activities of daily living and necessary medical care recommendations    Medical History:  Admit to [facility] Inpatient [facility] Unit  Restrict to [place]  Legal Status: INVOLUNTARY  Suicide/Behavioral/Elopement Precautions  Collateral from outside resource  General PRNs: Acetaminophen prn pain, MoM prn constipation, Maalox prn dyspepsia  DVT prophylaxis: Ambulatory  Diet: Regular  Group/Milieu & Music therapy - Coping Strategies, Social Skills  EKG/Labs/imaging:  Add vitamin d levels  WILL ASSESS FOR NEED OF A MEDICAL TYPE BED FOR THE FOLLOWING CRITERIA:  fall risk r/t weakness, confusion, malnutrition, and potential medication se's (orthostaic bp, sedation dizziness). Risk of Pressure ulcers r/t malnutrition and weight loss.     CODE STATUS:  A discussion was completed with the patient at the time of this entered document. Patient has requested: FULL CODE STATUS.    Psychiatry Diagnosis History/ Medical History:  BIPOLAR 2, MODERATE  PANIC DO WITH AGORAPHOBIA WAS ON ATIVAN BY PSYCHIATRY, RUNS OUT PER  NOTE 2/29/24 WENT TO ER  METRO WAS GIVEN VISTARIL 25MG  TID PRN-NOT ATIVAN  CHILD ONSET ADHD PREDOMINATELY INATTENTIVE  SITUATION ADJUSTMENT DISORDER  HISTORY OF ALCOHOL ABUSE DO  HISTORY OF SEIZURES PER OP PCP NOTES  POLYMYALIGA RHEUMATICA   Fe ANEMIA  HISTORY OF DVT  HISTORY OF CATATONIA 1/11/24, 5/28/24  CHRONS DISEASE 10/23/24  PARKINSONISM FROM SEROQUEL 1/8/25 reported          IMPRESSION:  Assessment & Plan  Bipolar II disorder (Multi)  -continue lamictal 50mg bid for augmentation for depressive phase in bipolar 2, depression  Panic disorder with agoraphobia  - Hydroxyzine PRN, history of xanax intoxication with DUI, no benzo prescribed per op psychiatry  H/O ETOH abuse  In remission, sustained per patient  Neuroleptic-induced  parkinsonism (Multi)  - Seroquel was d/c'd in 5/2024 due to parkinsonism. Continues to have abnormal facial movements.   ADHD, predominantly inattentive type  No stimulant ordered op  Polymyalgia rheumatica (Multi)  stable       Additional Consults:  Music therapy-coping   OT consult- safety assessment, coping, collateral  Internal Medicine- medical management  Social work consult:  Coping, disposition planning  Follow up outpatient appointments  Collateral from family, friends, if allowed  Records from last outpatient mental health care source, if available     Substance Use Risk Assessment:  Negative     Goal of inpatient psychiatry includes:  -symptom relief and coordination of care to promote recovery by daily assessment of risk  - collaboration of family/friends, continuing support plans are developed in preparation for discharge  -prevention of harm/destruction of self, others, and/or property  -prevention of of exacerbation of psychiatric symptoms  -management of medication with close monitoring of effects and control of side effects  -clinical interventions to address lack of impulse control OR SI,  HI, psychotic state, decreased functioning, failure to take medication resulting in symptom increase  - group therapy and psychoeducational groups daily  - SW consult dc planning  - The patient's admitting diagnosis, average indicated length of stay, risks and benefits of medication management the duration of need for medication treatment and post discharge plan of referral for follow up with mental health care, which will include referral to outpatient psychiatry/therapy, was reviewed with the patient, at the time of this admission.   - Safety counseling with emphasis on when and how to access 24 hour emergency services in mental as well as medical health (Mobile Crisis, 911 or coming to the nearest ER) was reviewed with the patient at the time of admission and will be reiterated during inpatient stay and at the  time of discharge. Referral will be made to return to medication management, therapist, case management as is indicated.  - Discharge to community once psychiatrically stable with outpatient follow up     Medication Consent,  risks, benefits, side effects reviewed for all ordered medications    HUY Jefferson-S2    I saw and evaluated the patient. I personally obtained the key and critical portions of the history and physical exam or was physically present for key and critical portions performed by the resident/fellow. I reviewed the resident/fellow's documentation and discussed the patient with the resident/fellow. I agree with the resident/fellow's medical decision making as documented in the note.    Deemed to have capacity to make medical, mental health decisions  Monitor    Elos< 3 days    Vanessa Lopez MD

## 2025-01-09 NOTE — GROUP NOTE
Group Topic: Goals   Group Date: 1/9/2025  Start Time: 0730  End Time: 0805  Facilitators: CRYSTAL Palafox   Department: [facility] REH[facility] THERAPY VIRTUAL    Number of Participants: 4   Group Focus: check in, daily focus, and goals  Treatment Modality: Recreation Therapy  Interventions utilized were: Bullet Journaling (Gumball Machine/Habits), clarification, exploration, and orientation  Purpose: Goal/Habit Identification, coping skills, insight or knowledge, and self-care    Name: Ryan Rasheed YOB: 1950   MR: [id number]      Facilitator: Recreational Therapist  Level of Participation:  attended briefly, unable to complete any session tasks, refused   Progress: None  Comments: The group discussed habits and utilized a journaling style to record habit productivity over a period of time.  Patients were provided an opportunity to share habits that they would like to improve or ones in which they want to start incorporating into a daily routine more frequently. Participants were provided information on bullet journaling and encouraged to identify some goals to work on for today.     Patient came to group appearing confused. He was informed that goal group was taking place and then breakfast will be right after. Patient was encouraged to attend the session and declined.     Plan: patient will be encouraged to complete the RT assessment and attend future programs

## 2025-01-09 NOTE — NURSING NOTE
"Patient withdrawn to self, out for breakfast. Rated anxiety 10/10 and depression 10/10. Pt refused as needed Atarax 25mg PO and requested xanax. Denied SI/HI. Denied auditory/visual/other hallucinations. No complaints of pain or discomfort. Medication compliant. Able to state positive coping skills such as \"listening to music\". Q15 minute checks to be maintained throughout shift for safety.   "

## 2025-01-09 NOTE — CONSULTS
"Patient has Malnutrition Diagnosis: Yes  Diagnosis Status: New  Malnutrition Diagnosis: Moderate malnutrition related to starvation  As Evidenced by: significant weight loss of 28%/1 year, loss of muscle and adipose stores  Additional Assessment Information: declines ONS when offered  Nutrition Assessment    Reason for Assessment: Admission nursing screening (unsure of weight loss, eating poorly)    Patient is a 74 y.o. male presenting with: Anxiety, depression, passive SI, and financial problems  PMHx: Bipolar depression, Panic disorder, Agoraphobia, TANI, ETOH abuse (in remission)       Nutrition History:  Food and Nutrient History: Pt reports poor appetite for about 3 months, unable to recall when weight loss started or when he weighed 90.9 kg (baseline). Pt reports he has dentures but does not have them with him. Pt reports a lactose intolerance- can eat yogurt and cheese. Pt has a difficult time with raw fruits/veggies and some meats, but declines having modified textures other than no raw fruits/veggies.  Energy Intake: Fair 50-75 %  Allergies   Allergen Reactions    Aspirin Other     Unable to tolerate due to hx of colitis. No allergy.        Ibuprofen Other     Hx of crohns and colitis         GI Symptoms: None  Vitamin/Herbal Supplement Use: none  Oral Problems: Chewing difficulty          Anthropometrics:  Height: 172.7 cm (5' 8\")   Weight: 66.1 kg (145 lb 11.6 oz)   BMI (Calculated): 22.16  IBW/kg (Dietitian Calculated): 70 kg  Percent of IBW: 94 %       Weight History:     Daily Weight  01/09/25 : 66.1 kg (145 lb 11.6 oz)  01/08/25 : 90.7 kg (200 lb)  05/03/24 : 90.7 kg (199 lb 15.3 oz)  01/04/24 : 91.6 kg (202 lb)  12/07/23 : 94.9 kg (209 lb 3.2 oz)  11/08/23 : 98 kg (216 lb)  10/23/23 : 95.3 kg (210 lb)  09/06/23 : 95.3 kg (210 lb)  06/06/23 : 88 kg (194 lb)  05/23/23 : 90.6 kg (199 lb 12.8 oz)    Weight         1/9/2025  0530             Weight: 66.1 kg (145 lb 11.6 oz)            Weight Change " "%:  Weight History / % Weight Change: 66.1 kg (1/9/25) down ; 90.7 kg (1/8/25) ? accuracy; 90.7 kg (5/3/24) 91.6 kg (1/4/24) down 28%/1 year  Significant Weight Loss: Yes  Interpretation of Weight Loss: >20% in 1 year       Nutrition Focused Physical Exam Findings:    Subcutaneous Fat Loss  Orbital Fat Pads: Mild-Moderate (slight dark circles and slight hollowing)  Buccal Fat Pads: Mild-Moderate (flat cheeks, minimal bounce)  Muscle Wasting  Temporalis: Mild-Moderate (slight depression)  Edema  Edema: none  Physical Findings (Nutrition Deficiency/Toxicity)  Skin: Negative (skin intact)    Nutrition Significant Labs:    Results from last 7 days   Lab Units 01/08/25  1336   GLUCOSE mg/dL 138*   SODIUM mmol/L 139   POTASSIUM mmol/L 3.7   CHLORIDE mmol/L 98   CO2 mmol/L 26   BUN mg/dL 12   CREATININE mg/dL 1.01   EGFR mL/min/1.73m*2 79   CALCIUM mg/dL 10.0   PHOSPHORUS mg/dL 4.0   MAGNESIUM mg/dL 2.01     No results found for: \"HGBA1C\"      Lab Results   Component Value Date    ALBUMIN 5.1 (H) 01/08/2025      No results found for: \"CRP\"    Nutrition Specific Medications:   Scheduled medications:  [Held by provider] cholecalciferol, 5,000 Units, oral, Daily  [Held by provider] gabapentin, 100 mg, oral, Nightly  lamoTRIgine, 50 mg, oral, BID  polyethylene glycol, 17 g, oral, Daily  sennosides-docusate sodium, 2 tablet, oral, BID  sertraline, 150 mg, oral, Daily  [Held by provider] tamsulosin, 0.4 mg, oral, Daily      Continuous medications:     PRN medications:  PRN medications: acetaminophen, acetaminophen, alum-mag hydroxide-simeth, diphenhydrAMINE **OR** diphenhydrAMINE, hydrOXYzine HCL, melatonin, QUEtiapine **OR** OLANZapine, traZODone     Nursing Data Per flowsheet:      Gastrointestinal  Gastrointestinal (WDL): Within Defined Limits (per pt)  Feeding assistance level: Independent  No intake or output data in the 24 hours ending 01/09/25 1357   0-10 (Numeric) Pain Score: 0 - No pain   Dietary Orders (From admission, " onward)       Start     Ordered    01/09/25 0503  Adult diet Regular  Diet effective now        Question:  Diet type  Answer:  Regular    01/09/25 0502                     Estimated Needs:   Total Energy Estimated Needs (kCal): 1983 kCal  Method for Estimating Needs: 30 kcal/kg  Total Protein Estimated Needs (g): 79 g  Method for Estimating Needs: 1.2 g/kg     Method for Estimating Needs: 1 ml/kcal       Nutrition Diagnosis   Malnutrition Diagnosis  Patient has Malnutrition Diagnosis: Yes  Diagnosis Status: New  Malnutrition Diagnosis: Moderate malnutrition related to starvation  As Evidenced by: significant weight loss of 28%/1 year, loss of muscle and adipose stores  Additional Assessment Information: declines ONS when offered            Nutrition Interventions/Recommendations   Nutrition Prescription:  diet, liquids    Nutrition Interventions:   Interventions: Medical food supplement  Medical Food Supplement: Modified food  Goal: Gelatein MCT daily= 260 kcal, 20 g protein    Coordination of Care: ABBY Finnegan  Nutrition Education:   N/A  Recommendations:  Add Lactose controlled to current diet order with no raw fruits/veggies  Weights: 2-3 x/week for trends  Monitor need for appetite stimulant         Nutrition Monitoring and Evaluation   Food/Nutrient Related History Monitoring  Monitoring and Evaluation Plan: Amount of food  Criteria: Pt will consume 50-75% of meals and ONS provided    Body Composition/Growth/Weight History  Monitoring and Evaluation Plan: Weight  Weight: Measured weight  Criteria: Monitor    Biochemical Data, Medical Tests and Procedures  Monitoring and Evaluation Plan: Electrolyte/renal panel, Glucose/endocrine profile  Criteria: Monitor      Time Spent (min): 60 minutes

## 2025-01-09 NOTE — PROGRESS NOTES
" REHAB Therapy Assessment & Treatment    Patient Name: Ryan Rasheed  MRN: [id number]  Today's Date: 1/9/2025    Activity Assessment:  Initial Assessment  Attention Span: 5-15 Minutes  Cognitive Behavior Status/Orientation: Person, Confused, Selective attention (ruminating thoughts, poor concentration)  Crisis Triggers: Emotions, Mood, Living situation, Finances (concerns about loss of home, \"money issues\")  Emotional Concerns/Mood/Affect: Anxious, Disinterested, Distracted, Fearful, Guarded, Pessimistic  Hearing: Adequate  Memory: Memory intact (reporting forgetfulness, poor concentration)  Motivation Level: Maximum encouragement needed  Negative Coping Skills: Denial, Substance use (ETOH per chart review, patient denied drinking when asked)  Speech/Communication/Socialization: Verbal, Responds when approached  Vision: Poor (reading glasses)  Additional Comments:  (refer to end of assessment additional comments)    Leisure Survey:  [facility] Leisure Interest Survey  Activity Preference: 1:1, Group  Activity Tolerance: Fair 15-30 minutes  At Home ADL Deficits: Grocery Shopping, Housekeeping, Laundry, Managing Money, Usual Leisure Activitie (declined ADLs reported)  Barriers to Leisure Participation: Behaviors, Emotions, Lack of finances/money, Living situation, Mood/affect, Lack of motivation, Personality, Substance use, Thought process, No one to participate with  Community Resources: Disinterested in becoming aware of commuity resource (patient sees a psychiatrist for outpatient assistance, he wasn't able to report any other community supports/resources)  Creative Activities:  (denied any interest)  Education/School:  (\"I have three college degress\", refused to describe)  Following Directions: Able to follow simple commands  Leisure Interests: Not active with identified interests, Needs to expand leisure interests  Living Arrangement: Alone (patient reports he lives alone without any pets)  Motivators for " "Recreation/Leisure Involvement: Relaxation  Outdoor Activities:  (patient reported \"no\" and that he \"doesn't go outside much\")  Passive Games:  (denied having any interest)  Patient/Family Education Needs:  (Leisure Awareness/Education, Coping Strategies, Community Safety)  Patient Strengths:  (unable to identify at this time)  Patient Weakness:  (anxiety related symptoms)  Physial Activity:  (past/bike riding)  Social/Group Activities:  (\"occasionally I'll go to the [employer]\")  Solitary Activities: Watch/listen television, Music, Play instrument (movies, classical jazz music, guitar/piano)  Special Hobbies:  (Coping Skills - Music)  Spectator Events:  (none reported)  Transportation: Drives  Work/Volunteer:  (unemployed, SSI, past worked in [employer] training)  Additional Comments: Mr. Rasheed was met with in his room 1:1. Patient has a history of depression, bipolar disorder, TANI, panic disorder, agoraphobia, and ETOH use. He was admitted for suicidal ideations. Patient denies needing any psychiatric care at this time and stated, “I don’t have any thoughts to self-harm”. Patient reports anxiety related to his living situation. He reports that he will be “losing his home within the next three months”. He shared that he is scheduled to meet with a  next Monday to discuss moving into an assisted living facility. Patient doesn’t live an active leisure lifestyle or have many coping strategies to manage his mental health symptoms. He was guarded and declined answering some of the assessment questions. He did admit that he “hasn’t been eating much” and that his “finances and care of home haven’t been good”. Patient also shared that he was “getting some new teeth hopefully soon”. Patient’s goal is to be discharged so he can make it to the Monday meeting for housing concerns. He was informed of unit programming and available independent leisure activities. He will be encouraged to attend a variety of " groups.    Therapeutic Recreation:     Encounter Problems       Encounter Problems (Active)       Distress Tolerance  RT       To learn ways to manage stress       Start:  01/09/25    Expected End:  01/22/25               Emotional  RT       Mood       Start:  01/09/25    Expected End:  01/22/25               Physical  RT       Relaxation       Start:  01/09/25    Expected End:  01/22/25

## 2025-01-10 PROCEDURE — 2500000001 HC RX 250 WO HCPCS SELF ADMINISTERED DRUGS (ALT 637 FOR MEDICARE OP): Performed by: PSYCHIATRY & NEUROLOGY

## 2025-01-10 PROCEDURE — 2500000002 HC RX 250 W HCPCS SELF ADMINISTERED DRUGS (ALT 637 FOR MEDICARE OP, ALT 636 FOR OP/ED): Performed by: PSYCHIATRY & NEUROLOGY

## 2025-01-10 PROCEDURE — 97150 GROUP THERAPEUTIC PROCEDURES: CPT | Mod: GO,CO

## 2025-01-10 PROCEDURE — 99233 SBSQ HOSP IP/OBS HIGH 50: CPT | Performed by: PSYCHIATRY & NEUROLOGY

## 2025-01-10 PROCEDURE — 1240000001 HC SEMI-PRIVATE BH ROOM DAILY

## 2025-01-10 PROCEDURE — 2500000004 HC RX 250 GENERAL PHARMACY W/ HCPCS (ALT 636 FOR OP/ED): Performed by: PSYCHIATRY & NEUROLOGY

## 2025-01-10 RX ADMIN — POLYETHYLENE GLYCOL 3350 17 G: 17 POWDER, FOR SOLUTION ORAL at 08:45

## 2025-01-10 RX ADMIN — Medication 125 MCG: at 08:45

## 2025-01-10 RX ADMIN — SERTRALINE HYDROCHLORIDE 150 MG: 50 TABLET ORAL at 08:45

## 2025-01-10 RX ADMIN — LAMOTRIGINE 50 MG: 25 TABLET ORAL at 08:45

## 2025-01-10 RX ADMIN — TRAZODONE HYDROCHLORIDE 200 MG: 100 TABLET ORAL at 20:19

## 2025-01-10 RX ADMIN — HYDROXYZINE HYDROCHLORIDE 25 MG: 25 TABLET ORAL at 17:40

## 2025-01-10 RX ADMIN — LAMOTRIGINE 50 MG: 25 TABLET ORAL at 20:20

## 2025-01-10 RX ADMIN — TAMSULOSIN HYDROCHLORIDE 0.4 MG: 0.4 CAPSULE ORAL at 08:45

## 2025-01-10 RX ADMIN — SENNOSIDES AND DOCUSATE SODIUM 2 TABLET: 50; 8.6 TABLET ORAL at 20:19

## 2025-01-10 ASSESSMENT — COLUMBIA-SUICIDE SEVERITY RATING SCALE - C-SSRS
2. HAVE YOU ACTUALLY HAD ANY THOUGHTS OF KILLING YOURSELF?: NO
6. HAVE YOU EVER DONE ANYTHING, STARTED TO DO ANYTHING, OR PREPARED TO DO ANYTHING TO END YOUR LIFE?: NO
2. HAVE YOU ACTUALLY HAD ANY THOUGHTS OF KILLING YOURSELF?: NO
1. SINCE LAST CONTACT, HAVE YOU WISHED YOU WERE DEAD OR WISHED YOU COULD GO TO SLEEP AND NOT WAKE UP?: NO
1. SINCE LAST CONTACT, HAVE YOU WISHED YOU WERE DEAD OR WISHED YOU COULD GO TO SLEEP AND NOT WAKE UP?: NO
6. HAVE YOU EVER DONE ANYTHING, STARTED TO DO ANYTHING, OR PREPARED TO DO ANYTHING TO END YOUR LIFE?: NO

## 2025-01-10 ASSESSMENT — PAIN SCALES - GENERAL
PAINLEVEL_OUTOF10: 0 - NO PAIN
PAINLEVEL_OUTOF10: 0 - NO PAIN

## 2025-01-10 ASSESSMENT — PAIN - FUNCTIONAL ASSESSMENT
PAIN_FUNCTIONAL_ASSESSMENT: 0-10
PAIN_FUNCTIONAL_ASSESSMENT: 0-10

## 2025-01-10 NOTE — GROUP NOTE
Group Topic: Self Esteem   Group Date: 1/10/2025  Start Time: 0730  End Time: 0810  Facilitators: CRYSTAL Palafox   Department: [facility] REHAB THERAPY VIRTUAL    Number of Participants: 6   Group Focus: check in, daily focus, goals, and self-esteem  Treatment Modality: Recreation Therapy  Interventions utilized were: 8 Steps to Improving Your Self-Esteem, Goals, exploration and support  Purpose: coping skills, self-worth, and self-care    Name: Ryan Rasheed YOB: 1950   MR: [id number]      Facilitator: Recreational Therapist  Level of Participation: did not attend  Progress: None  Comments: We discussed some ways to improve one’s self esteem by looking at eight strategies. Patients were asked to provide examples and problem solve through the instructions (handout). Participants were asked to focus on one or two of the provided skills and establish a goal related to it.    Patient remained in their room throughout the session resting/sleeping.     Plan: continue with services

## 2025-01-10 NOTE — NURSING NOTE
"Patient is cooperative, needs reinforcement to engage in unit activities and be out on unit. He is visibly anxious during morning assessment, patient focused on speaking with SW. Anxiety \"100\", depression \"100\", denies SI/HI, AH/VH. He refused sennosides-docusate this am. He is otherwise med compliant. He has order for discharge tomorrow, friend is picking up patient at 1130 and taking patient back home, resources for assisted living facilities on front of chart to go home with patient tomorrow. 15 safety checks maintained.      "

## 2025-01-10 NOTE — CARE PLAN
The patient's goals for the shift include Patient did not have one    The clinical goals for the shift include Maintain safety and medication compliance    Ryan has slept well this evening, at long intervals.  When awake, he did not have any needs or complaints.

## 2025-01-10 NOTE — GROUP NOTE
Group Topic: Leisure Skills   Group Date: 1/10/2025  Start Time: 1400  End Time: 1500  Facilitators: JUANA PalafoxS   Department: [facility] REHAB THERAPY VIRTUAL    Number of Participants: 10   Group Focus: creative, feeling awareness/expression and leisure skills  Treatment Modality: Recreation Therapy  Interventions utilized were: Leisure Coat of Arms,  exploration, leisure development, mental fitness, reminiscence, story telling, and support  Purpose: coping skills, feelings, insight or knowledge, self-worth, and self-care    Name: Ryan Rasheed YOB: 1950   MR: [id number]      Facilitator: Recreational Therapist  Level of Participation: moderate  Quality of Participation: passive and quiet  Interactions with others:  minimal, difficult to understand, soft spoken  Mood/Affect: anxious and fidgety   Triggers (if applicable): N/A  Cognition: confused, processing slowly, and capable with assistance  Progress: Moderate  Comments: This session involved using creative ways to answer six questions to evaluate and heighten awareness to one’s leisure/recreational lifestyle. It included identifying two things you love to do, two people that have influenced you, the place you had a positive or memorable experience, two things you enjoy to do with family/friends/or support, three things you value most, and three words you would want others to say about you. Participants were asked to share and discuss the importance of a healthy leisure lifestyle.     Mr. Rasheed needed some encouragement to join the session. He completed the provided handout using words only. Patient provided a few of the answers he wrote down when asked to share. He had difficulties problem solving questions needing additional examples and prompts.     Plan: continue with services

## 2025-01-10 NOTE — NURSING NOTE
Ryan remained laying in his bed, sleeping on and off, for the entire evening.  He denies feeling anxious and/or depressed, denies having SI/HI, denies AVH and denies having any pain at this time.  He did not want to come out into the dining room for a snack tonight.

## 2025-01-10 NOTE — CARE PLAN
Phoned patient's friend Prashant Gillespie, p. [phone], and reviewed patient's care and plan for discharge; Mr. Gillespie agreed to  patient tomorrow at 11:30 am for discharge and is not concerned for his safety.  This sw attached list of other Assisted Living options on his chart for the nurse to give hard copy list to at discharge.

## 2025-01-10 NOTE — PROGRESS NOTES
"Occupational Therapy     REHAB Therapy Assessment & Treatment    Patient Name: Ryan Rasheed  MRN: [id number]  Today's Date: 1/10/2025      Activity Assessment:     Self-Discovery and Personal Understanding Group: [id number]   Let Go/ Hold On Group: [id number]   Team Collaboration/ Cognitive Task Group: [id number]    1/3 Groups attended     Pt only present and participates in team collab group this date as pt invited to earlier groups but appears anxious stating \"I am just really worried about my discharge plan.\" Pt offered encouragement and support with slightly improved affect however, remains in his room vs attending groups at that time. During attended group, pt with G attention tot ask and does demo improved confidence when sharing his thoughts for his team. Pt with on topic responses 100% of the time and overall appears with improved affect, G understanding of how this activity could be a positive distraction and does well socially during the activity. Pt continues to demo gradual/F progress d/t inconsistent improvement toward OT goals as evidenced above. Pt would benefit from continued OT services in order to improve overall self-esteem, personal confidence and positive supports for safe transition at discharge.        Encounter Problems       Encounter Problems (Active)       OT Goals       Pt will demo increased activity level by attending 8-10 groups per week.  (Progressing)       Start:  01/09/25    Expected End:  01/30/25            Pt will explore and ID 1-2 strategies to manage stressors/symptoms of illness/ grief more effectively prior to discharge.  (Progressing)       Start:  01/09/25    Expected End:  01/30/25            Pt will ID/ utilize 1-2 ways to increase balance of activity/ re-involve self in functional daily routines/roles prior to discharge.  (Progressing)       Start:  01/09/25    Expected End:  01/30/25            Pt will ID 2-3 effective ways to distract from crisis and ID stressors/ " personal symptoms of stress in order to improve management of stress during daily activities.  (Progressing)       Start:  01/09/25    Expected End:  01/30/25            Pt will ID 2 community resources/programs to join/attend after D/C to improve their support system (Progressing)       Start:  01/09/25    Expected End:  01/30/25                       Additional Comments:    SALINAS collaborated with patients nurse and charge nurse throughout the day to provide appropriate support and encouragement to attend groups. Pt up on unit when SALINAS left last group of the day. All needs met.

## 2025-01-10 NOTE — CARE PLAN
Problem: Sensory Perceptual Alteration as Evidenced by  Goal: Cooperates with admission process  Outcome: Progressing  Goal: Patient/Family participate in treatment and discharge plans  Outcome: Progressing  Goal: Patient/Family verbalizes awareness of resources  Outcome: Progressing     Problem: Potential for Harm to Self or Others  Goal: Participates in unit activities  Outcome: Progressing  Goal: Patient/Family participate in treatment and discharge plans  Outcome: Progressing     Problem: Community resource needs  Goal: Patient is receiving increased resource support to enhance ability to remain at home  Outcome: Progressing     Problem: Mental health issues  Goal: Stabilize adverse mental health factors affecting plan of care  Outcome: Progressing     Problem: Access to Care Issue  Goal: Assess and Address Access Barriers  Outcome: Progressing     Problem: Coordination of Community Resources Needed  Goal: Coordination of Services will be Obtained  Outcome: Progressing     Problem: Coordination of Psychosocial Support Services Needed  Goal: Coordination of Services will be Obtained  Outcome: Progressing     Problem: Risk of Uncoordinated Care  Goal: Care will be Coordinated and Supported by a Multidisciplinary Team of Providers  Outcome: Progressing

## 2025-01-10 NOTE — CARE PLAN
The patient's goals for the shift include No goal stated    The clinical goals for the shift include Maintain safety and medication compliance    Over the shift, the patient did not make progress toward the following goals. Barriers to progression include housing concerns. Recommendations to address these barriers include communicate with SW.

## 2025-01-10 NOTE — ASSESSMENT & PLAN NOTE
- Hydroxyzine PRN, history of xanax intoxication with DUI, no benzo prescribed per op psychiatry   - increase trazodone to 200 mg before bedtime

## 2025-01-10 NOTE — DISCHARGE INSTR - APPOINTMENTS
Tobacco Cessation Follow Up Appointment:  At  [facility],  Date: 01/14/2025 at 12:00 Noon.  This appointment will be conducted over the phone; Call [phone], to schedule or verify your appointment.      Outpatient Mental Health Follow Up Appointments:     Psychiatrist: Dr. CASTILLO (medication management)  On   Date: Tuesday, January 28th, 2025 at 10:30 AM, In Person.  At:  [facility]  [id number]  [place], [place]      P. [phone];  F. [phone];

## 2025-01-10 NOTE — GROUP NOTE
Group Topic: Gross Motor/Balance Skills   Group Date: 1/10/2025  Start Time: 1600  End Time: 1700  Facilitators: CRYSTAL Palafox   Department: [facility] REHAB THERAPY VIRTUAL    Number of Participants: 10   Group Focus: Physical/Movement, leisure skills  Treatment Modality: Recreation Therapy  Interventions utilized were: [employer] Wii, exploration, leisure development, and story telling  Purpose: Leisure Awareness/education, Physical Leisure Activity, Social Stimulation, Pleasurable Activity, Coping Skills     Name: Ryan Rasheed YOB: 1950   MR: [id number]      Facilitator: Recreational Therapist  Level of Participation: active  Quality of Participation: cooperative, passive, and quiet  Interactions with others:  minimal  Mood/Affect: anxious and hesitant  Triggers (if applicable): N/A  Cognition:  capable, processing slowly   Progress: Moderate  Comments: This session involved participating in multiple movement games via the "[employer]i. Patient participants were encouraged to practice coordinating motor movements, socialize with peers, and increase leisure awareness. For those participants that don't utilize technology for leisure they will be assisted with cognitive and movement tasks.      Patient completed all group tasks needing minimal assistance for comprehension. Physically patient was provided only supervision and he completed all turns while standing.     Plan: continue with services

## 2025-01-10 NOTE — CARE PLAN
Phoned pt's son, Tyshawn, p. [phone]; reached voice mail, left message with request for call back;  set up follow up with [facility] with his outpatient stabilization and noted After Visit Summary accordingly;  He is slated for discharge tomorrow, 01/11/2025 with his friend, Jorge Luis Gillespie;  Sw to follow.

## 2025-01-10 NOTE — PROGRESS NOTES
"Ryan Rasheed is a 74 y.o. male on day 1 of admission presenting with Bipolar II disorder and panic disorder.     The patient was seen and examined. I reviewed the chart and vital signs from overnight. I reviewed previous notes. I reviewed medications, administered overnight and their reported benefits or side effects. Patient reported by nursing to have slept 9 UB hours. Patient stated sleep was restless, had difficulty falling asleep despite sleep aids.     Patient denied drug side effects.  The patient's nurse this morning reported, overnight, no agitated behavioral disturbances.   The patient reported that he was feeling a \"little anxious\" today requesting to speak to his  for a list of housing options. Patient states that he still has an appointment scheduled on Monday to discuss an assisted living facility but is \"worried it is not going to work out\". He has talked to his friend on the phone yesterday, has confirmed that his friend will pick him up on Saturday morning. Has only attended a few groups since arrival. Denies depressive symptoms, suicidal ideations, and AH/VH. Was able to shave and trim nails yesterday with help of nursing staff and feels \"much better\" today.        Mental Status Exam:   General: appropriately groomed and dressed in hospital attire.  Appearance: appears stated age.  Attitude: calm, cooperative.  Behavior: appropriate eye contact. Hesitant to leave following examination.   Motor Activity: repetitive lip smacking behaviors, rigidity in movements, altered gait  Speech: regular rate, rhythm, volume and tone, spontaneous, fluent, non-pressured.  Mood: \"a little anxious\" anxiety 8 depression 3  Affect: stable  Thought Process: organized, and goal directed.  Thought Content: does not currently endorse suicidal ideation,  denies homicidal ideations, no delusions elicited   Thought Perception: does not endorse auditory hallucinations, denies visual hallucinations, no " "tactile, olfactory, or gustatory hallucinations elicited.   Cognition: alert, oriented to person, place and time, adequate fund of knowledge, no deficit in recent and remote memory, no deficits in attention, concentration or language.  Insight: fair, as patient recognizes symptoms of  illness and need for recommended treatments.   Judgment: fair, as patient can make reasonable decisions about ordinary activities of daily living and necessary medical care recommendations.      Objective:     Scheduled medications  cholecalciferol, 5,000 Units, oral, Daily  lamoTRIgine, 50 mg, oral, BID  polyethylene glycol, 17 g, oral, Daily  sennosides-docusate sodium, 2 tablet, oral, BID  sertraline, 150 mg, oral, Daily  tamsulosin, 0.4 mg, oral, Daily  traZODone, 200 mg, oral, Nightly      Continuous medications     PRN medications  PRN medications: acetaminophen, acetaminophen, alum-mag hydroxide-simeth, diphenhydrAMINE **OR** diphenhydrAMINE, hydrOXYzine HCL, melatonin, QUEtiapine **OR** OLANZapine       Vitals:      1/8/2025     9:40 PM 1/9/2025     3:51 AM 1/9/2025     5:30 AM 1/9/2025     5:31 AM 1/9/2025     6:21 PM 1/9/2025     6:23 PM 1/10/2025     6:00 AM   Vitals   Systolic 119 123 103 99 114 99 117   Diastolic 85 64 66 63 72 61 73   BP Location   Right arm Right arm Right arm  Right arm   Heart Rate 89 80 87 85 92 101 76   Temp 36.5 °C (97.7 °F)  36.3 °C (97.3 °F)  36.5 °C (97.7 °F)  36.4 °C (97.5 °F)   Resp 18 20 16 16   16   Height   1.727 m (5' 8\")       Weight (lb)   145.72       BMI   22.16 kg/m2       BSA (m2)   1.78 m2              Labs:  Results for orders placed or performed during the hospital encounter of 01/08/25 (from the past 96 hours)   CBC and Auto Differential   Result Value Ref Range    WBC 9.0 4.4 - 11.3 x10*3/uL    nRBC 0.0 0.0 - 0.0 /100 WBCs    RBC 5.91 (H) 4.50 - 5.90 x10*6/uL    Hemoglobin 17.0 13.5 - 17.5 g/dL    Hematocrit 49.6 41.0 - 52.0 %    MCV 84 80 - 100 fL    MCH 28.8 26.0 - 34.0 pg    " MCHC 34.3 32.0 - 36.0 g/dL    RDW 14.2 11.5 - 14.5 %    Platelets 251 150 - 450 x10*3/uL    Neutrophils % 79.3 40.0 - 80.0 %    Immature Granulocytes %, Automated 0.4 0.0 - 0.9 %    Lymphocytes % 12.5 13.0 - 44.0 %    Monocytes % 7.1 2.0 - 10.0 %    Eosinophils % 0.1 0.0 - 6.0 %    Basophils % 0.6 0.0 - 2.0 %    Neutrophils Absolute 7.12 (H) 1.60 - 5.50 x10*3/uL    Immature Granulocytes Absolute, Automated 0.04 0.00 - 0.50 x10*3/uL    Lymphocytes Absolute 1.12 0.80 - 3.00 x10*3/uL    Monocytes Absolute 0.64 0.05 - 0.80 x10*3/uL    Eosinophils Absolute 0.01 0.00 - 0.40 x10*3/uL    Basophils Absolute 0.05 0.00 - 0.10 x10*3/uL   Comprehensive Metabolic Panel   Result Value Ref Range    Glucose 138 (H) 74 - 99 mg/dL    Sodium 139 136 - 145 mmol/L    Potassium 3.7 3.5 - 5.3 mmol/L    Chloride 98 98 - 107 mmol/L    Bicarbonate 26 21 - 32 mmol/L    Anion Gap 19 10 - 20 mmol/L    Urea Nitrogen 12 6 - 23 mg/dL    Creatinine 1.01 0.50 - 1.30 mg/dL    eGFR 79 >60 mL/min/1.73m*2    Calcium 10.0 8.6 - 10.3 mg/dL    Albumin 5.1 (H) 3.4 - 5.0 g/dL    Alkaline Phosphatase 110 33 - 136 U/L    Total Protein 8.5 (H) 6.4 - 8.2 g/dL    AST 27 9 - 39 U/L    Bilirubin, Total 1.0 0.0 - 1.2 mg/dL    ALT 37 10 - 52 U/L   Acute Toxicology Panel, Blood   Result Value Ref Range    Acetaminophen <10.0 10.0 - 30.0 ug/mL    Salicylate  <3 4 - 20 mg/dL    Alcohol <10 <=10 mg/dL   TSH with reflex to Free T4 if abnormal   Result Value Ref Range    Thyroid Stimulating Hormone 2.27 0.44 - 3.98 mIU/L   Magnesium   Result Value Ref Range    Magnesium 2.01 1.60 - 2.40 mg/dL   Phosphorus   Result Value Ref Range    Phosphorus 4.0 2.5 - 4.9 mg/dL   Red Top   Result Value Ref Range    Extra Tube Hold for add-ons.    Electrocardiogram, 12-lead   Result Value Ref Range    Ventricular Rate 97 BPM    Atrial Rate 97 BPM    IL Interval 138 ms    QRS Duration 88 ms    QT Interval 354 ms    QTC Calculation(Bazett) 449 ms    P Axis 63 degrees    R Axis 55 degrees     T Axis 55 degrees    QRS Count 16 beats    Q Onset 214 ms    P Onset 145 ms    P Offset 199 ms    T Offset 391 ms    QTC Fredericia 415 ms   Drug Screen, Urine   Result Value Ref Range    Amphetamine Screen, Urine Presumptive Negative Presumptive Negative    Barbiturate Screen, Urine Presumptive Negative Presumptive Negative    Benzodiazepines Screen, Urine Presumptive Negative Presumptive Negative    Cannabinoid Screen, Urine Presumptive Negative Presumptive Negative    Cocaine Metabolite Screen, Urine Presumptive Negative Presumptive Negative    Fentanyl Screen, Urine Presumptive Negative Presumptive Negative    Opiate Screen, Urine Presumptive Negative Presumptive Negative    Oxycodone Screen, Urine Presumptive Negative Presumptive Negative    PCP Screen, Urine Presumptive Negative Presumptive Negative    Methadone Screen, Urine Presumptive Negative Presumptive Negative   Urinalysis with Reflex Culture and Microscopic   Result Value Ref Range    Color, Urine Yellow Light-Yellow, Yellow, Dark-Yellow    Appearance, Urine Clear Clear    Specific Gravity, Urine 1.029 1.005 - 1.035    pH, Urine 5.5 5.0, 5.5, 6.0, 6.5, 7.0, 7.5, 8.0    Protein, Urine 50 (1+) (A) NEGATIVE, 10 (TRACE), 20 (TRACE) mg/dL    Glucose, Urine Normal Normal mg/dL    Blood, Urine NEGATIVE NEGATIVE    Ketones, Urine 40 (2+) (A) NEGATIVE mg/dL    Bilirubin, Urine 0.5 (1+) (A) NEGATIVE    Urobilinogen, Urine 4 (2+) (A) Normal mg/dL    Nitrite, Urine NEGATIVE NEGATIVE    Leukocyte Esterase, Urine NEGATIVE NEGATIVE   Extra Urine Gray Tube   Result Value Ref Range    Extra Tube Hold for add-ons.    Urinalysis Microscopic   Result Value Ref Range    WBC, Urine 6-10 (A) 1-5, NONE /HPF    RBC, Urine 3-5 NONE, 1-2, 3-5 /HPF    Mucus, Urine 4+ Reference range not established. /LPF    Hyaline Casts, Urine 3+ (A) NONE /LPF          Assessment Plan of Care:  Assessment & Plan  Bipolar II disorder (Multi)  -continue lamictal 50mg bid for augmentation for  depressive phase in bipolar 2, depression  Panic disorder with agoraphobia  - Hydroxyzine PRN, history of xanax intoxication with DUI, no benzo prescribed per op psychiatry   - increase trazodone to 200 mg before bedtime  Polymyalgia rheumatica (Multi)  stable    Current malnutriton diganoses:  Malnutrition Diagnosis: Moderate malnutrition related to starvation    dc in am if stable overnight    Miladis Gómez    I saw and evaluated the patient. I personally obtained the key and critical portions of the history and physical exam or was physically present for key and critical portions performed by the resident/fellow. I reviewed the resident/fellow's documentation and discussed the patient with the resident/fellow. I agree with the resident/fellow's medical decision making as documented in the note.    Vanessa Lopez MD

## 2025-01-11 VITALS
OXYGEN SATURATION: 94 % | RESPIRATION RATE: 18 BRPM | SYSTOLIC BLOOD PRESSURE: 114 MMHG | WEIGHT: 145.72 LBS | HEART RATE: 77 BPM | TEMPERATURE: 97.7 F | BODY MASS INDEX: 22.09 KG/M2 | DIASTOLIC BLOOD PRESSURE: 69 MMHG | HEIGHT: 68 IN

## 2025-01-11 PROCEDURE — 99239 HOSP IP/OBS DSCHRG MGMT >30: CPT | Performed by: PSYCHIATRY & NEUROLOGY

## 2025-01-11 PROCEDURE — 2500000001 HC RX 250 WO HCPCS SELF ADMINISTERED DRUGS (ALT 637 FOR MEDICARE OP): Performed by: PSYCHIATRY & NEUROLOGY

## 2025-01-11 PROCEDURE — 2500000002 HC RX 250 W HCPCS SELF ADMINISTERED DRUGS (ALT 637 FOR MEDICARE OP, ALT 636 FOR OP/ED): Performed by: PSYCHIATRY & NEUROLOGY

## 2025-01-11 RX ADMIN — Medication 125 MCG: at 08:07

## 2025-01-11 RX ADMIN — HYDROXYZINE HYDROCHLORIDE 25 MG: 25 TABLET ORAL at 08:07

## 2025-01-11 RX ADMIN — SERTRALINE HYDROCHLORIDE 150 MG: 50 TABLET ORAL at 08:07

## 2025-01-11 RX ADMIN — LAMOTRIGINE 50 MG: 25 TABLET ORAL at 08:07

## 2025-01-11 RX ADMIN — TAMSULOSIN HYDROCHLORIDE 0.4 MG: 0.4 CAPSULE ORAL at 08:07

## 2025-01-11 ASSESSMENT — COLUMBIA-SUICIDE SEVERITY RATING SCALE - C-SSRS
2. HAVE YOU ACTUALLY HAD ANY THOUGHTS OF KILLING YOURSELF?: NO
1. SINCE LAST CONTACT, HAVE YOU WISHED YOU WERE DEAD OR WISHED YOU COULD GO TO SLEEP AND NOT WAKE UP?: NO
6. HAVE YOU EVER DONE ANYTHING, STARTED TO DO ANYTHING, OR PREPARED TO DO ANYTHING TO END YOUR LIFE?: NO

## 2025-01-11 ASSESSMENT — PAIN - FUNCTIONAL ASSESSMENT: PAIN_FUNCTIONAL_ASSESSMENT: 0-10

## 2025-01-11 ASSESSMENT — PAIN SCALES - GENERAL: PAINLEVEL_OUTOF10: 0 - NO PAIN

## 2025-01-11 NOTE — CARE PLAN
"The patient's goals for the shift include \"can't go home\"    The clinical goals for the shift include maintain safety    Over the shift, the patient did not make progress toward the following goals. Barriers to progression include anxiety. Recommendations to address these barriers include medication compliance.    "

## 2025-01-11 NOTE — NURSING NOTE
Patient discharged at 1149, patients friend Prashant came to unit to pick patient up and take him home, Patients friend Prashant spoke with Dr. Lopez outside of unit about discharge plan for patient. Prashant inquired about discharge instructions, discharge instructions along with unit phone number and Assisted living information provided by [facility] but in bags of patient belongings. Prashant made aware. This nurse went over discharge instructions with patient, patient didn't verbalize any questioned about discharge.

## 2025-01-11 NOTE — GROUP NOTE
"Group Topic: Chemical Dependency - Dual Diagnosis   Group Date: 1/11/2025  Start Time: 0930  End Time: 1025  Facilitators: CRYSTAL Reis   Department: [facility]    Number of Participants: 8   Group Focus: dual diagnosis, goals, personal responsibility, psychiatric education, relapse prevention, and safety plan  Treatment Modality: Recreational Therapy   Interventions utilized were Thought - \"Slowly changing how we think and act\", Relapse Prevention Plan, exploration, patient education, story telling, and support  Purpose: coping skills, insight or knowledge, self-worth, self-care, relapse prevention strategies, and trigger / craving management    Name: Ryan Rasheed YOB: 1950   MR: [id number]      Facilitator: Recreational Therapist  Level of Participation: did not attend  Progress: None  Comments: Patients were provided with the Thought of the Day reading - “Slowly changing how we think and act.” Patients were given an opportunity to share their thoughts and encouraged to create a personal goal based on the reading. Patients were also provided with the \"Relapse Prevention Plan\" worksheet which includes several areas to reflect and respond to recovery related questions (personal goals/how you would like to improve, triggers and emotional states that lead to relapse, plan to manage triggers, and contacts/support)    Patient declined invitation to group activity at this time. Patient will continue to be provided with opportunities to enhance leisure skills and/or coping mechanisms.    Plan: continue with services      "

## 2025-01-11 NOTE — CARE PLAN
"The patient's goals for the shift include \"get sleep\"    The clinical goals for the shift include medication compliance    Over the shift, the patient did not make progress toward the following goals. Barriers to progression include lack of medication knowledge. Recommendations to address these barriers include more education on medications.    Pt slept all night long  Pt took his night time medications and went to bed  Pt had an uneventful night   "

## 2025-01-11 NOTE — DISCHARGE SUMMARY
"       Discharge Summary      Reason For Admission: The reason for admission includes: passive suicidal ideations which patient denied he never made the statment chronic anxiety, depression worse, and financial problems.  Onset of symptoms was gradual starting 6 months ago with gradually worsening course since that time. Psychosocial Stressors: financial, housing.   Discharge Destination: home    Discharge Diagnosis:  Bipolar II disorder (Multi)  Panic disorder with agoraphobia  Alcohol use disorder in remission  Neuroleptic-induced parkinsonism (Multi)  ADHD, predominantly inattentive type  Polymyalgia rheumatica (Multi)  Benzodiazapine misuse history, with legal consequences    HISTORY OF PRESENT ILLNESS:  Ryan Rasheed is a 74 year old male who presented to [facility] ED via ambulance from outpatient psychiatrist visit. Patient \"does not know why I am here\". He was told that the psychiatrist told EMS that he had made suicidal statements in the session but he denies making any such statements. Ryan currently denies SI, and states those thoughts have \"never crossed his mind\". He has no past history of SA.      Patient states that his anxiety and depression are both 10/10 in severity. During the interview he reports that his only psychiatric illness is Bipolar disorder, although his EMR demonstrates additional history of Panic Disorder, agoraphobia, TANI, and alcohol abuse (in remission). His primary source of anxiety is financial and states that he will be evicted from his house in 3-6 months as he can no longer afford to make mortgage payments. He is not currently employed, states that he lives off of savings and [email or web address]. He \"thought there was much more in the bank than there actually is\".      He has one son who lives in [place]. He infrequently communicates with his son but states they have a good relationship. Patient states he is able to properly care for himself and perform all ADLs and IADLs independently. However, " "patient has had increase in anxiety and depression over past 6 months primarily related to financial difficulties. Reports he only sleeps 2-3 hours a night, has frequent panic attacks, and near constant worry. Has low appetite. Denies feelings of guilt, hopelessness, or worthlessness. Endorses some anhedonia, used to enjoy cycling but has not gone recently.      Patient sees a outpatient psychiatrist every other week for medication management for Bipolar disorder. He is currently taking Lamictal but feels \"the medication doesn't help my mood at all\". Denies episodes of katy. He has received psychotherapy in the past, but has quit and denies interest in restarting. Denies ETOH and substance use. Remote legal history of DUI.     Hospital course:  Ryan reported his anxiety had increased in the last few months related to decreased personal funds, home at risk for foreclosure. Patient presented with anxiety, chronic panic disorder, adhd *not on a stimulant, bipolar on Lamictal, Zoloft and trazodone.  Patient reported he makes appointments with telehealth saw provider day of admission. During this admission, patient adamantly denied he was suicidal, he does not recall making suicidal statements, patient said he did not know what he would do if he became homeless. Patient stated his anxiety had increased due to worry he would loose his home and has been more preoccupied. During this admission, the patient ate meals, slept without issue (last night, 9 hours  unbroken). The patient stated that he wished to be discharged to make his appointment.      The patient was seen daily by the team, which included the provider, nursing, occupational therapy and social work. Patient received education regarding their diagnosis and treatment plan. Patient was visible on the unit, medication compliant, cooperative  with care and help seeking. The patient attended group therapy, worked on individualized coping skills and was goal " oriented to the future and to ongoing stabilization of their mental health needs.     MMSE:                                                                                                                       General: cm with anxiety, bipolar, financial issues  Appearance: appropriate grooming and hygiene, appears stated age  Attitude:  cooperative  Behavior: appropriate eye contact  Movement: no psychomotor agitation or retardation. No EPS/TD. Normal gait and station. Normal muscle tone/bulk.  Speech and language:  regular rate, rhythm, volume and tone, spontaneous, fluent.   Mood: denied depression stated he is worried he will be homeless if he does not get medicaid and apply to [place]  Affect:  anxious  Thought process: linear, organized, logical, goal directed thinking and processing  Thought content: does not endorse suicidal ideation or homicidal ideations, no delusions elicited.  Perception: does not endorse auditory/visual hallucinations. Does not appear to be responding to hallucinatory stimuli, no olfactory, somatic or tactile hallucinations were appreciated.  Cognition:  alert and oriented to person, place, time and purpose, short and long term memory within normal limits, attention and concentration within normal limits  Insight:  fair, as patient recognizes symptoms of illness and need for recommended treatments.   Judgment:  can make reasonable decisions about ordinary activities of daily living and necessary medical care recommendations    LABS   Electrocardiogram, 12-lead    Result Date: 1/9/2025  Normal sinus rhythm Normal ECG When compared with ECG of 03-MAY-2024 10:42, T wave amplitude has decreased in Lateral leads Reconfirmed by Geovanni Clayton ([id number]) on 1/9/2025 6:27:18 PM    CT head wo IV contrast    Result Date: 1/8/2025  Interpreted By:  Hans Villasenor, STUDY: CT HEAD WO IV CONTRAST  1/8/2025 2:11 pm   INDICATION: Signs/Symptoms:AMS   COMPARISON: None.   ACCESSION NUMBER(S): [id number]    "ORDERING CLINICIAN: FELIPA CONRAD   TECHNIQUE: Contiguous axial CT images of the brain were obtained without IV contrast.   FINDINGS: The ventricles, cisterns and sulci are prominent, consistent with moderate diffuse volume loss. Areas of white matter low attenuation are nonspecific but likely related to chronic microvascular disease. There is intracranial atherosclerosis.   Gray-white differentiation is preserved. No acute intracranial hemorrhage or mass effect. No midline shift. Patent basal cisterns. No extraaxial fluid collections.   The calvaria is intact. The visualized paranasal sinuses and mastoid air cells are clear.       No acute intracranial pathology.     Signed by: Hans Villasenor 1/8/2025 2:23 PM Dictation workstation:   [id number]    XR chest 1 view    Result Date: 1/8/2025  Interpreted By:  Jessica Maier, STUDY: XR CHEST 1 VIEW 1/8/2025 1:52 pm   INDICATION: Signs/Symptoms:Failure to thrive   COMPARISON: 05/03/2024   ACCESSION NUMBER(S): [id number]   ORDERING CLINICIAN: FELIPA CONRAD   TECHNIQUE: AP erect view of the chest at bedside   FINDINGS: The cardiac size is normal. No mediastinal or hilar abnormality is seen. The lungs are clear. No pleural abnormality is observed.       No acute cardiopulmonary disease.   Signed by: Jessica Maier 1/8/2025 2:14 PM Dictation workstation:   [id number]    [id number]    FUNCTIONAL ESTIMATES  Estimate of Intelligence: above average   Estimate of Capacity for Activities of Daily Living:   independent, requires some assistance     A safety risk assessment was completed on the day of discharge. The patient is judged a minimal suicide risk due to:  1)  No access to guns.  2) Denied prior suicide attempts.  3) Denies current suicidal ideation or plans  4) Goal directed to the future: \"get housing with AL\"  5) No current symptoms of a major depressive episode.  The team deemed the patient to be a low risk of self harm and recommend the patient for discharge today.    Substance " Use Risk Assessment:  Negative     I spent over 30 minutes in the preparation of this summary. All 11 elements of the transition record were discussed with the patient and or caregiver and the receiving inpatient facility (if applicable).  A copy of the transition record was given to the patient and was transmitted to the outpatient provider accepting the patient's care following  discharge.    Patient's illness, medication/potential for medication side effects, and the medication recommended along with the importance of mediation compliance benefits and risk were reviewed prior to discharge with the patient and with designated family member patient (if applicable).  The patient voiced understanding of their diagnosis and treatment plan.  The patient was counseled not to stop medications without the supervision of a psychiatrist.  The patient was counseled to follow-up with their outpatient medical provider as indicated.   The patient was counseled that if there was an increase in mental health issues, depression, anxiety, medication side effects, self harming thoughts or thoughts to harm others, to call Mobile Crisis or 911 and come to the nearest emergency room.   The patient also received information regarding advanced mental and medical health directives during this hospitalization which they could discuss with their outpatient provider.   The plan was discussed with the patient, the nurse and the social work department. The patient voiced understanding and agreement with the plan.     these medications from [facility] #[id number] - [place], [place] - [address] AT [facility]  Cholecalciferol     Medications on Discharge:  Current Outpatient Medications   Medication Instructions    acetaminophen (TYLENOL) 650 mg, oral, Every 4 hours PRN    cholecalciferol (VITAMIN D-3) 125 mcg, oral, Daily    lamoTRIgine (LAMICTAL) 50 mg, oral, 2 times daily    melatonin 3 mg, oral, Nightly PRN    multivitamin  with folic acid (One Daily Multivitamin) 400 mcg tablet 1 tablet, oral, Daily    polyethylene glycol (GLYCOLAX, MIRALAX) 17 g, oral, Daily    sennosides-docusate sodium (Bonnie-Colace) 8.6-50 mg tablet 2 tablets, oral, 2 times daily    sertraline (ZOLOFT) 100 mg, oral, Daily, 150mg total    sertraline (ZOLOFT) 50 mg, oral, Daily, Total 150mg     tamsulosin (FLOMAX) 0.4 mg, oral, Daily    traZODone (DESYREL) 100-200 mg, oral, Nightly PRN       Medication to Stop:   STOP taking:  clonazePAM 0.5 mg tablet (KlonoPIN)   gabapentin 100 mg capsule (Neurontin)   OLANZapine 15 mg tablet (ZyPREXA)       Follow up appointments:    PATIENT IS A NON SMOKER (UPDATED IN SOCIAL HISTORY) HE WILL NOT NEED REFERRAL TO TOBACCO CESSATION CLINIC    Outpatient Mental Health Follow Up Appointments:      Psychiatrist: Dr. CASTILLO (medication management)  On   Date: Tuesday, January 28th, 2025 at 10:30 AM, In Person.  At:  [facility]  [address]  [place], [place]       P. [phone];  F. [phone];   Community Resources  Crisis Center Hotline:  National Suicide  & Crisis Prevention Lifeline: Call or Text 888 or 1-367.277.3357 (TALK)  Crisis Text Line: Text HOME to 656572       Vanessa Lopez MD

## 2025-01-11 NOTE — NURSING NOTE
"Pt interview in the patients room  Pt is getting ready for bed  Pt stated his day was \"OK\"  Pt rated his anxiety 7/10  depression 6/10  and denied everything else at this time  Pt stated his goal \"get sleep\"   his strength \" I make people laugh\"  and his coping skill \" I walk away\"  Pt is appropriate with his answers to the questions at this time   "

## 2025-01-13 NOTE — SIGNIFICANT EVENT
Follow Up Phone Call    Outgoing phone call    Spoke to: Ryan Rasheed Relationship:self   Phone number: [phone]      Outcome: I left a message on answering machine   No chief complaint on file.         Diagnosis:Not applicable

## 2025-04-05 ENCOUNTER — APPOINTMENT (OUTPATIENT)
Dept: RADIOLOGY | Facility: HOSPITAL | Age: 75
End: 2025-04-05
Payer: MEDICARE

## 2025-04-05 ENCOUNTER — HOSPITAL ENCOUNTER (EMERGENCY)
Facility: HOSPITAL | Age: 75
Discharge: HOME | End: 2025-04-05
Attending: EMERGENCY MEDICINE
Payer: MEDICARE

## 2025-04-05 VITALS
DIASTOLIC BLOOD PRESSURE: 79 MMHG | BODY MASS INDEX: 25.64 KG/M2 | TEMPERATURE: 97.9 F | RESPIRATION RATE: 16 BRPM | SYSTOLIC BLOOD PRESSURE: 126 MMHG | HEART RATE: 70 BPM | WEIGHT: 163.36 LBS | OXYGEN SATURATION: 94 % | HEIGHT: 67 IN

## 2025-04-05 DIAGNOSIS — F41.1 ANXIETY REACTION: Primary | ICD-10-CM

## 2025-04-05 LAB
ANION GAP SERPL CALCULATED.3IONS-SCNC: 12 MMOL/L (ref 10–20)
BASOPHILS # BLD AUTO: 0.07 X10*3/UL (ref 0–0.1)
BASOPHILS NFR BLD AUTO: 0.9 %
BUN SERPL-MCNC: 10 MG/DL (ref 6–23)
CALCIUM SERPL-MCNC: 9 MG/DL (ref 8.6–10.3)
CARDIAC TROPONIN I PNL SERPL HS: 3 NG/L (ref 0–20)
CHLORIDE SERPL-SCNC: 105 MMOL/L (ref 98–107)
CO2 SERPL-SCNC: 26 MMOL/L (ref 21–32)
CREAT SERPL-MCNC: 0.8 MG/DL (ref 0.5–1.3)
EGFRCR SERPLBLD CKD-EPI 2021: >90 ML/MIN/1.73M*2
EOSINOPHIL # BLD AUTO: 0.15 X10*3/UL (ref 0–0.4)
EOSINOPHIL NFR BLD AUTO: 1.9 %
ERYTHROCYTE [DISTWIDTH] IN BLOOD BY AUTOMATED COUNT: 14.4 % (ref 11.5–14.5)
GLUCOSE SERPL-MCNC: 101 MG/DL (ref 74–99)
HCT VFR BLD AUTO: 37.9 % (ref 41–52)
HGB BLD-MCNC: 12.9 G/DL (ref 13.5–17.5)
IMM GRANULOCYTES # BLD AUTO: 0.19 X10*3/UL (ref 0–0.5)
IMM GRANULOCYTES NFR BLD AUTO: 2.4 % (ref 0–0.9)
LYMPHOCYTES # BLD AUTO: 2.14 X10*3/UL (ref 0.8–3)
LYMPHOCYTES NFR BLD AUTO: 27.3 %
MCH RBC QN AUTO: 30.6 PG (ref 26–34)
MCHC RBC AUTO-ENTMCNC: 34 G/DL (ref 32–36)
MCV RBC AUTO: 90 FL (ref 80–100)
MONOCYTES # BLD AUTO: 0.6 X10*3/UL (ref 0.05–0.8)
MONOCYTES NFR BLD AUTO: 7.7 %
NEUTROPHILS # BLD AUTO: 4.69 X10*3/UL (ref 1.6–5.5)
NEUTROPHILS NFR BLD AUTO: 59.8 %
NRBC BLD-RTO: 0 /100 WBCS (ref 0–0)
PLATELET # BLD AUTO: 207 X10*3/UL (ref 150–450)
POTASSIUM SERPL-SCNC: 3.6 MMOL/L (ref 3.5–5.3)
RBC # BLD AUTO: 4.22 X10*6/UL (ref 4.5–5.9)
SODIUM SERPL-SCNC: 139 MMOL/L (ref 136–145)
WBC # BLD AUTO: 7.8 X10*3/UL (ref 4.4–11.3)

## 2025-04-05 PROCEDURE — 99284 EMERGENCY DEPT VISIT MOD MDM: CPT | Performed by: EMERGENCY MEDICINE

## 2025-04-05 PROCEDURE — 85025 COMPLETE CBC W/AUTO DIFF WBC: CPT | Performed by: EMERGENCY MEDICINE

## 2025-04-05 PROCEDURE — 36415 COLL VENOUS BLD VENIPUNCTURE: CPT | Performed by: EMERGENCY MEDICINE

## 2025-04-05 PROCEDURE — 71045 X-RAY EXAM CHEST 1 VIEW: CPT

## 2025-04-05 PROCEDURE — 84484 ASSAY OF TROPONIN QUANT: CPT | Performed by: EMERGENCY MEDICINE

## 2025-04-05 PROCEDURE — 71045 X-RAY EXAM CHEST 1 VIEW: CPT | Performed by: SURGERY

## 2025-04-05 PROCEDURE — 80048 BASIC METABOLIC PNL TOTAL CA: CPT | Performed by: EMERGENCY MEDICINE

## 2025-04-05 ASSESSMENT — PAIN SCALES - GENERAL
PAINLEVEL_OUTOF10: 0 - NO PAIN

## 2025-04-05 ASSESSMENT — PAIN - FUNCTIONAL ASSESSMENT: PAIN_FUNCTIONAL_ASSESSMENT: 0-10

## 2025-04-05 ASSESSMENT — COLUMBIA-SUICIDE SEVERITY RATING SCALE - C-SSRS
1. IN THE PAST MONTH, HAVE YOU WISHED YOU WERE DEAD OR WISHED YOU COULD GO TO SLEEP AND NOT WAKE UP?: NO
6. HAVE YOU EVER DONE ANYTHING, STARTED TO DO ANYTHING, OR PREPARED TO DO ANYTHING TO END YOUR LIFE?: NO
2. HAVE YOU ACTUALLY HAD ANY THOUGHTS OF KILLING YOURSELF?: NO

## 2025-04-05 NOTE — DISCHARGE INSTRUCTIONS
Thank you for choosing [facility] Emergency Department. It was my pleasure to be involved in your care today.         As of today's visit, based on reasonable likelihood, that it is safe for you to be discharged back to your residence to follow-up as an outpatient for ongoing management of your medical problem. You should follow-up with any referrals / primary provider as soon as possible. The contacts (number, addresses) are listed below.         Important:  Even though we think it is safe for you to go home, there is always a small chance that we are missing something that could require hospitalization.  Therefore it is very important that if you get worse or develops any new symptoms that you return here as soon as possible to be re-evaluated.  This includes return of symptoms that have resolved such as fainting, chest pain, or symptoms that could be warning signs for stroke important:  Even though we think it is safe for you to go home, there is always a small chance that we are missing something that could require hospitalization.  Therefore it is very important that if you get worse or develops any new symptoms that you return here as soon as possible to be re-evaluated.  This includes return of symptoms that have resolved such as fainting, chest pain, or symptoms that could be warning signs for stroke         Make sure your pharmacy and primary doctor is aware of any new medications prescribed today.          It is your responsibility to contact as soon as possible, and follow through with, any referrals you were given today. We do recommend you inform them you are a [facility] follow-up patient, as often they can better accommodate your need to be seen, provided their schedules allow. We will, and have, made every effort to ensure you have access to adequate follow-up specialists available.          All problems may not be able to be fixed in one ER visit. This is why timely ongoing care is important, and this  is a responsibility you share in. Further, you are free to follow up with any provider you choose, and this is not limited to our suggestion.          If cultures were obtained today, you will be contacted should anything result that would require further treatment. Please contact the ED at the number provided with questions.          Having trouble affording medications? Try [email or web address]! (This is not a hospital endorsed website, merely a recommendation based on my own personal experiences with [employer])

## 2025-04-05 NOTE — ED PROVIDER NOTES
"HPI   Chief Complaint   Patient presents with    Chest Pain     Presents to ED via EMS from [facility].  Per EMS, pt walked to the nurses station complaining of midsternal chest pain.  Pt states \"my heart was going crazy\".   Denies pain upon arrival.        HPI  74-year-old male presents from nursing home with complaint of chest pain.  Patient has history of anxiety felt like his heart was beating faster short of breath at the nursing station EMS was called and he is here in the hospital.  Feels better now.  Not dizzy or lightheaded.  Patient states this feels similar to his anxiety attacks in the past.  No history of any recent illness.  No abdominal pain.  No other complaints.      Patient History   No past medical history on file.  No past surgical history on file.  No family history on file.  Social History     Tobacco Use    Smoking status: Former     Types: Cigarettes     Passive exposure: Past    Smokeless tobacco: Never   Substance Use Topics    Alcohol use: Defer    Drug use: Never       Physical Exam   ED Triage Vitals [04/05/25 0231]   Temperature Heart Rate Respirations BP   36.6 °C (97.9 °F) 79 16 125/84      Pulse Ox Temp Source Heart Rate Source Patient Position   94 % Oral Monitor --      BP Location FiO2 (%)     -- --       Physical Exam  General:  Awake, alert, no acute distress.  Head: Normocephalic, Atraumatic  Neck: Supple, trachea midline, no stridor  Skin: Warm and dry, no rashes   Lungs: Clear to auscultation bilaterally no acute respiratory distress, speaking in full sentences without difficulty  CV: Regular Rate Rhythm with no obvious murmurs gallops rubs noted, no jugular venous distention, no pedal edema   Abdomen: Soft, nontender, nondistended, positive bowel sounds, no peritoneal signs  Neuro:  No gross focal neurologic deficits, NIH is 0  Musculoskeletal:  Full range of motion in all 4 extremities  Psychiatric:  Alert oriented x 3, Good insight into condition.    ED Course & MDM "   Diagnoses as of 04/05/25 0310   Anxiety reaction                 No data recorded     Makayla Coma Scale Score: 15 (04/05/25 0234 : Alexandira Junior RN)                           Medical Decision Making  My EKG interpretation at 0 225:  Sinus rhythm 79 bpm normal axis ND interval 140 QTc 449 occasional PVCs no acute ischemic changes noted    Patient's workup in the ED is unremarkable.  His symptoms seem to be more consistent with anxiety reaction.  Discussed the finding with the patient at bedside.  I feel he is stable for discharge back to the F.  Procedure  Procedures     Jorge Luis Neely DO  04/05/25 0311